# Patient Record
Sex: FEMALE | Race: WHITE | Employment: FULL TIME | URBAN - METROPOLITAN AREA
[De-identification: names, ages, dates, MRNs, and addresses within clinical notes are randomized per-mention and may not be internally consistent; named-entity substitution may affect disease eponyms.]

---

## 2020-02-03 ENCOUNTER — LAB REQUISITION (OUTPATIENT)
Dept: LAB | Facility: HOSPITAL | Age: 45
End: 2020-02-03
Payer: COMMERCIAL

## 2020-02-03 DIAGNOSIS — D22.0 MELANOCYTIC NEVI OF LIP: ICD-10-CM

## 2020-02-03 DIAGNOSIS — D22.39 MELANOCYTIC NEVI OF OTHER PARTS OF FACE: ICD-10-CM

## 2020-02-03 PROCEDURE — 88305 TISSUE EXAM BY PATHOLOGIST: CPT | Performed by: DERMATOLOGY

## 2020-03-06 ENCOUNTER — OFFICE VISIT (OUTPATIENT)
Dept: FAMILY MEDICINE CLINIC | Facility: CLINIC | Age: 45
End: 2020-03-06
Payer: COMMERCIAL

## 2020-03-06 VITALS
DIASTOLIC BLOOD PRESSURE: 78 MMHG | SYSTOLIC BLOOD PRESSURE: 118 MMHG | HEART RATE: 71 BPM | OXYGEN SATURATION: 98 % | TEMPERATURE: 98 F

## 2020-03-06 DIAGNOSIS — L01.00 IMPETIGO: Primary | ICD-10-CM

## 2020-03-06 PROCEDURE — 99202 OFFICE O/P NEW SF 15 MIN: CPT | Performed by: FAMILY MEDICINE

## 2020-03-06 RX ORDER — SULFAMETHOXAZOLE AND TRIMETHOPRIM 800; 160 MG/1; MG/1
1 TABLET ORAL 2 TIMES DAILY
Qty: 14 TABLET | Refills: 0 | Status: SHIPPED | OUTPATIENT
Start: 2020-03-06 | End: 2020-03-13

## 2020-03-06 NOTE — PATIENT INSTRUCTIONS
Take antibiotics with food and plenty of water. Eat yogurt or take probiotic daily. (Staci Bradford is a good example of an OTC probiotic)  Make sure to finish the entire antibiotic treatment.   Increase fluids and rest.     Apply mupirocin to affected area-- twic

## 2020-03-07 NOTE — PROGRESS NOTES
CHIEF COMPLAINT:   Patient presents with:  Rash Skin Problem: small bump inside nostril-- right. noticed 1 week ago. pulled at it and rmoved some scabbing-- skin felt sensitive. but has been spreading towards opening of nostril.  notes watery d/c. had cold palpitations. LUNGS: denies shortness of breath with exertion or rest. No wheezing, no cough. LYMPH: no enlargement of the lymph nodes. MUSC/SKEL: no joint swelling, no joint stiffness. NEURO: no abnormal sensation, no tingling of the skin or numbness. these issues and agrees to the plan. The patient is asked to return in 2-3 days if sx's persist or worsen.

## 2021-09-18 ENCOUNTER — HOSPITAL ENCOUNTER (EMERGENCY)
Facility: HOSPITAL | Age: 46
Discharge: HOME | End: 2021-09-18
Attending: EMERGENCY MEDICINE
Payer: COMMERCIAL

## 2021-09-18 VITALS
BODY MASS INDEX: 20.31 KG/M2 | DIASTOLIC BLOOD PRESSURE: 81 MMHG | OXYGEN SATURATION: 100 % | HEART RATE: 83 BPM | RESPIRATION RATE: 18 BRPM | SYSTOLIC BLOOD PRESSURE: 130 MMHG | TEMPERATURE: 99 F | WEIGHT: 134 LBS | HEIGHT: 68 IN

## 2021-09-18 DIAGNOSIS — S01.81XA FOREHEAD LACERATION, INITIAL ENCOUNTER: Primary | ICD-10-CM

## 2021-09-18 PROCEDURE — 90471 IMMUNIZATION ADMIN: CPT

## 2021-09-18 PROCEDURE — 12011 RPR F/E/E/N/L/M 2.5 CM/<: CPT

## 2021-09-18 PROCEDURE — 99283 EMERGENCY DEPT VISIT LOW MDM: CPT

## 2021-09-18 NOTE — ED PROVIDER NOTES
Patient Seen in: [facility] Emergency Department      History   Patient presents with:  Laceration/Abrasion    Stated Complaint: Hit head with tennis raquet, laceration    Subjective:   HPI    Patient is a pleasant 58-year-old female, presenting for ear normal.   Eyes:      Extraocular Movements: Extraocular movements intact. Conjunctiva/sclera: Conjunctivae normal.   Cardiovascular:      Rate and Rhythm: Normal rate.    Pulmonary:      Effort: Pulmonary effort is normal.   Abdominal:      General Medication List

## 2021-09-20 PROCEDURE — 88377 M/PHMTRC ALYS ISHQUANT/SEMIQ: CPT | Performed by: RADIOLOGY

## 2021-09-21 ENCOUNTER — NURSE NAVIGATOR ENCOUNTER (OUTPATIENT)
Dept: HEMATOLOGY/ONCOLOGY | Facility: HOSPITAL | Age: 46
End: 2021-09-21

## 2021-09-21 NOTE — PROGRESS NOTES
Spoke with patient regarding newly diagnosed breast cancer. Pt had imaging through [facility], and is set up to see Dr. Karis Poe tomorrow. Introduced myself and my role as the breast nurse navigator and how I can assist in her care.  We discussed multidisciplinary

## 2021-09-27 NOTE — PROGRESS NOTES
THE [facility]ematology Oncology Group Consultation Note      Patient Name: Edward Nunez   YOB: 1975  Medical Record Number: [id number]  Consulting Physician: Gal Adame. Juan Manuel Lyman M.D. Referring Physician: Ward Miller M.D.     Date of Con 76s and has had multiple skin cancers including melanoma. Ms. Sintia Lindsey mother was an only child. Ms. Sintia Lindsey maternal grandmother is living in her 80s.  Ms. Sintia Lindsey maternal grandfather  in his early 76s and did not have cancer.       KADEN Position: Sitting, Cuff Size: adult)   Pulse 65   Temp 98.4 °F (36.9 °C) (Temporal)   Resp 16   Ht 1.727 m (5' 7.99\")   Wt 59.9 kg (132 lb)   LMP 08/30/2021   SpO2 100%   BMI 20.08 kg/m²     Physical Examination   Constitutional  Well developed and well n nRBC Absolute 0.000 0.000 - 0.012 10ˆ3/µL    Neutrophils % 59.0 %    Lymphocytes % 31.4 %    Monocytes % 7.4 %    Eosinophils % 1.2 %    Basophils % 1.0 %    nRBC/100 WBC 0.00 %     Radiology   DATE OF SERVICE: 09.17.2021  BILATERAL DIAGNOSTIC MAMMOGRAM WI       ---------------------------------------------------------------------------------------------------  IMPRESSION AND RECOMMENDATION  Suspicious finding.  Lobulated hypoechoic solid mass corresponding to the patient's palpable concern  and nodularity recommend neoadjuvant chemotherapy with TCHP. If she is Her2 not amplified, I will recommend initial surgical resection. I reviewed the surgical options of lumpectomy and mastectomy. Per Dr. Gurwinder Mena, she is a candidate for lumpectomy.  We discusse

## 2021-09-28 ENCOUNTER — OFFICE VISIT (OUTPATIENT)
Dept: SURGERY | Facility: CLINIC | Age: 46
End: 2021-09-28
Payer: COMMERCIAL

## 2021-09-28 ENCOUNTER — NURSE ONLY (OUTPATIENT)
Dept: HEMATOLOGY/ONCOLOGY | Facility: HOSPITAL | Age: 46
End: 2021-09-28
Attending: SPECIALIST
Payer: COMMERCIAL

## 2021-09-28 ENCOUNTER — NURSE NAVIGATOR ENCOUNTER (OUTPATIENT)
Dept: HEMATOLOGY/ONCOLOGY | Facility: HOSPITAL | Age: 46
End: 2021-09-28

## 2021-09-28 ENCOUNTER — GENETICS ENCOUNTER (OUTPATIENT)
Dept: GENETICS | Facility: HOSPITAL | Age: 46
End: 2021-09-28
Attending: SPECIALIST
Payer: COMMERCIAL

## 2021-09-28 VITALS
SYSTOLIC BLOOD PRESSURE: 132 MMHG | BODY MASS INDEX: 20 KG/M2 | TEMPERATURE: 98 F | HEART RATE: 65 BPM | DIASTOLIC BLOOD PRESSURE: 60 MMHG | WEIGHT: 132 LBS | OXYGEN SATURATION: 100 % | RESPIRATION RATE: 16 BRPM | HEIGHT: 67.99 IN

## 2021-09-28 DIAGNOSIS — Z17.0 MALIGNANT NEOPLASM OF UPPER-OUTER QUADRANT OF RIGHT BREAST IN FEMALE, ESTROGEN RECEPTOR POSITIVE (HCC): ICD-10-CM

## 2021-09-28 DIAGNOSIS — C50.411 MALIGNANT NEOPLASM OF UPPER-OUTER QUADRANT OF RIGHT BREAST IN FEMALE, ESTROGEN RECEPTOR POSITIVE (HCC): Primary | ICD-10-CM

## 2021-09-28 DIAGNOSIS — C50.411 MALIGNANT NEOPLASM OF UPPER-OUTER QUADRANT OF RIGHT BREAST IN FEMALE, ESTROGEN RECEPTOR POSITIVE (HCC): ICD-10-CM

## 2021-09-28 DIAGNOSIS — Z17.0 MALIGNANT NEOPLASM OF UPPER-OUTER QUADRANT OF RIGHT BREAST IN FEMALE, ESTROGEN RECEPTOR POSITIVE (HCC): Primary | ICD-10-CM

## 2021-09-28 PROCEDURE — 3075F SYST BP GE 130 - 139MM HG: CPT | Performed by: SPECIALIST

## 2021-09-28 PROCEDURE — 96040 HC GENETIC COUNSELING EA 30 MIN: CPT | Performed by: GENETIC COUNSELOR, MS

## 2021-09-28 PROCEDURE — 36415 COLL VENOUS BLD VENIPUNCTURE: CPT

## 2021-09-28 PROCEDURE — 99244 OFF/OP CNSLTJ NEW/EST MOD 40: CPT | Performed by: SURGERY

## 2021-09-28 PROCEDURE — 99245 OFF/OP CONSLTJ NEW/EST HI 55: CPT | Performed by: SPECIALIST

## 2021-09-28 PROCEDURE — 3078F DIAST BP <80 MM HG: CPT | Performed by: SPECIALIST

## 2021-09-28 PROCEDURE — 3008F BODY MASS INDEX DOCD: CPT | Performed by: SPECIALIST

## 2021-09-28 NOTE — PROGRESS NOTES
Met with patient in clinic. Introduced myself as the breast navigator nurse and explained the role of the breast nurse navigator and coordination of care.  Patient was previously given the breast cancer treatment handbook and reviewed over how to use this r

## 2021-09-28 NOTE — PROGRESS NOTES
Referring Provider:  Michael Jeffery MD    Additional Provider(s):  MD Suman Suggs MD    Reason for Referral:  Jacquelin Beckett was referred for genetic counseling because of a new diagnosis of breast cancer.   Ms. Ange Ansari is 20-24% of women with ovarian cancer have a hereditary cancer syndrome.   Signs of a hereditary cancer syndrome include some rare cancers, common cancers occurring at unusually young ages, multiple primary cancers in the same individual, or the same type of be determined according to personal and family histories and should be discussed with a physician.       A variant of uncertain significance is a DNA change that may or may not alter the function of the gene; therefore, it is usually not possible to determi lifetime risk of male breast cancer. Medical management options for BRCA1/2 mutation carriers include increased breast cancer screening using mammograms and MRI, ovarian cancer screening, chemoprevention, and prophylactic surgery.   Men with a BRCA1/2 mu

## 2021-09-28 NOTE — H&P
New Patient Visit Note       Active Problems      1.  Malignant neoplasm of upper-outer quadrant of right breast in female, estrogen receptor positive [facility])        Chief Complaint   Right breast cancer    History of Present Illness   The patient is a 46-yea Medical History:   Diagnosis Date   • Anemia    • Costochondritis    • Migraine headache    • Sjogren's disease ([facility] [address]      Past Surgical History:   Procedure Laterality Date   • TONSILLECTOMY  1990    DR. PHYLICIA COLÓN IN [facility]       The family easy bleeding and easy bruising  Integumentary:  Negative for pruritus and rash  Musculoskeletal:  Negative for bone/joint symptoms  Neurological:  Negative for gait disturbance  Psychiatric:  Negative for inappropriate interaction and psychiatric symptoms pectoral adenopathy. Left upper body: No axillary or pectoral adenopathy. Neurological:      Mental Status: She is alert and oriented to person, place, and time.    Psychiatric:         Speech: Speech normal.         Behavior: Behavior normal. malignancy     These findings and recommendations were discussed with the patient at the time of examination. Patient stressed verbal understanding. Ordering physician office notified.  Patient was assisted in  scheduling for ultrasound-guided core biopsy Breast MRI   I personally reviewed the report. DATE OF SERVICE: 09.24.2021  EXAMINATION:   1.  MRI of the breasts with and without contrast.  2. Post processing including computer algorithm analysis of MRI image data for lesion  detection/character foci demonstrating subthreshold enhancement kinetics  without  any suspicious dominant enhancing mass or nonmass enhancement. No suspicious internal mammary lymphadenopathy.   Prominent left axillary lymph nodes without a definite fatty hilum (axial image discussed including bleeding, infection, need for reoperation, and arm swelling. She expressed understanding.   · We discussed the role of radiation therapy and if she opts for lumpectomy, she will need radiation therapy and the length and course of radiati care.  Over half of that time was spent on counseling and coordination of care.     Yan Crawford MD

## 2021-09-28 NOTE — PROGRESS NOTES
Patient is here today for Consult with Mirian Sacks for Breast Cancer. Patient denies pain. Medication list and medical history were reviewed and updated.      Education Record    Learner:  Patient and spouse     Disease / Diagnosis: Consult    Valeria / Aleksandra Solano

## 2021-09-28 NOTE — H&P (VIEW-ONLY)
New Patient Visit Note       Active Problems      1.  Malignant neoplasm of upper-outer quadrant of right breast in female, estrogen receptor positive [facility])        Chief Complaint   Right breast cancer    History of Present Illness   The patient is a 46-yea Medical History:   Diagnosis Date   • Anemia    • Costochondritis    • Migraine headache    • Sjogren's disease ([facility] [address]      Past Surgical History:   Procedure Laterality Date   • TONSILLECTOMY  1990    DR. PHYLICIA COLÓN IN [facility]       The family easy bleeding and easy bruising  Integumentary:  Negative for pruritus and rash  Musculoskeletal:  Negative for bone/joint symptoms  Neurological:  Negative for gait disturbance  Psychiatric:  Negative for inappropriate interaction and psychiatric symptoms pectoral adenopathy. Left upper body: No axillary or pectoral adenopathy. Neurological:      Mental Status: She is alert and oriented to person, place, and time.    Psychiatric:         Speech: Speech normal.         Behavior: Behavior normal. malignancy     These findings and recommendations were discussed with the patient at the time of examination. Patient stressed verbal understanding. Ordering physician office notified.  Patient was assisted in  scheduling for ultrasound-guided core biopsy Breast MRI   I personally reviewed the report. DATE OF SERVICE: 09.24.2021  EXAMINATION:   1.  MRI of the breasts with and without contrast.  2. Post processing including computer algorithm analysis of MRI image data for lesion  detection/character foci demonstrating subthreshold enhancement kinetics  without  any suspicious dominant enhancing mass or nonmass enhancement. No suspicious internal mammary lymphadenopathy.   Prominent left axillary lymph nodes without a definite fatty hilum (axial image discussed including bleeding, infection, need for reoperation, and arm swelling. She expressed understanding.   · We discussed the role of radiation therapy and if she opts for lumpectomy, she will need radiation therapy and the length and course of radiati care.  Over half of that time was spent on counseling and coordination of care.     Olamide Nobles MD

## 2021-09-30 ENCOUNTER — NURSE NAVIGATOR ENCOUNTER (OUTPATIENT)
Dept: HEMATOLOGY/ONCOLOGY | Facility: HOSPITAL | Age: 46
End: 2021-09-30

## 2021-09-30 NOTE — PROGRESS NOTES
Called patient and confirmed full name and  and gave her the results of HER-2/FISH of negative.  Stated to her that the plan, per instructions from Dr. Erik Smallwood, was to await the results of her breast ultrasound and genetics and then proceed with breast s

## 2021-10-05 ENCOUNTER — GENETICS ENCOUNTER (OUTPATIENT)
Dept: HEMATOLOGY/ONCOLOGY | Facility: HOSPITAL | Age: 46
End: 2021-10-05

## 2021-10-05 NOTE — PROGRESS NOTES
Referring Provider:  Lilia Leyva MD     Additional Provider(s):  MD Arjun Johnson MD Arelia Creighton, MD     Reason for Referral:  Saira Block had genetic testing performed on 9/28/21 because of a new diagnosis of sakina included in this panel might be the cause of cancer in Ms. Angella Collazo or her relatives. Ms. Angella Collazo should contact me on an annual basis to learn if there have been any updates in genetic testing that would apply to her.     In the meantime, Ms. Sofy Manley

## 2021-10-06 ENCOUNTER — NURSE NAVIGATOR ENCOUNTER (OUTPATIENT)
Dept: HEMATOLOGY/ONCOLOGY | Facility: HOSPITAL | Age: 46
End: 2021-10-06

## 2021-10-06 ENCOUNTER — TELEPHONE (OUTPATIENT)
Dept: SURGERY | Facility: CLINIC | Age: 46
End: 2021-10-06

## 2021-10-06 NOTE — TELEPHONE ENCOUNTER
I called the patient with an update since we now have the final results from her pathology and genetic testing. Her tumor is HER-2 negative, and she has normal genetics.   The patient previously had indicated she would like to go forward with a lumpectomy

## 2021-10-06 NOTE — PROGRESS NOTES
Called patient back and we discussed that her genetic testing was negative and that her ultrasound results were reviewed with her by Dr. Valdemar Snyder and the plan was to proceed with surgery.  I reached out to Dr. Maia Barrios surgery scheduler, Kelechi Batres, to help wit

## 2021-10-07 ENCOUNTER — TELEPHONE (OUTPATIENT)
Dept: GENERAL RADIOLOGY | Facility: HOSPITAL | Age: 46
End: 2021-10-07

## 2021-10-07 ENCOUNTER — TELEPHONE (OUTPATIENT)
Dept: SURGERY | Facility: CLINIC | Age: 46
End: 2021-10-07

## 2021-10-07 DIAGNOSIS — C50.911 MALIGNANT NEOPLASM OF RIGHT FEMALE BREAST, UNSPECIFIED ESTROGEN RECEPTOR STATUS, UNSPECIFIED SITE OF BREAST (HCC): Primary | ICD-10-CM

## 2021-10-08 ENCOUNTER — TELEPHONE (OUTPATIENT)
Dept: GENERAL RADIOLOGY | Facility: HOSPITAL | Age: 46
End: 2021-10-08

## 2021-10-12 ENCOUNTER — LAB ENCOUNTER (OUTPATIENT)
Dept: LAB | Facility: HOSPITAL | Age: 46
End: 2021-10-12
Attending: SURGERY
Payer: COMMERCIAL

## 2021-10-12 DIAGNOSIS — C50.411 MALIGNANT NEOPLASM OF UPPER-OUTER QUADRANT OF RIGHT BREAST IN FEMALE, ESTROGEN RECEPTOR POSITIVE (HCC): ICD-10-CM

## 2021-10-12 DIAGNOSIS — Z17.0 MALIGNANT NEOPLASM OF UPPER-OUTER QUADRANT OF RIGHT BREAST IN FEMALE, ESTROGEN RECEPTOR POSITIVE (HCC): ICD-10-CM

## 2021-10-14 ENCOUNTER — HOSPITAL ENCOUNTER (OUTPATIENT)
Dept: MAMMOGRAPHY | Facility: HOSPITAL | Age: 46
Discharge: HOME | End: 2021-10-14
Attending: SURGERY
Payer: COMMERCIAL

## 2021-10-14 ENCOUNTER — HOSPITAL ENCOUNTER (OUTPATIENT)
Dept: NUCLEAR MEDICINE | Facility: HOSPITAL | Age: 46
Setting detail: HOSPITAL OUTPATIENT SURGERY
Discharge: HOME | End: 2021-10-14
Attending: SURGERY | Admitting: SURGERY
Payer: COMMERCIAL

## 2021-10-14 ENCOUNTER — ANESTHESIA (OUTPATIENT)
Dept: SURGERY | Facility: HOSPITAL | Age: 46
End: 2021-10-14
Payer: COMMERCIAL

## 2021-10-14 ENCOUNTER — APPOINTMENT (OUTPATIENT)
Dept: MAMMOGRAPHY | Facility: HOSPITAL | Age: 46
End: 2021-10-14
Attending: SURGERY
Payer: COMMERCIAL

## 2021-10-14 ENCOUNTER — ANESTHESIA EVENT (OUTPATIENT)
Dept: SURGERY | Facility: HOSPITAL | Age: 46
End: 2021-10-14
Payer: COMMERCIAL

## 2021-10-14 ENCOUNTER — HOSPITAL ENCOUNTER (OUTPATIENT)
Facility: HOSPITAL | Age: 46
Setting detail: HOSPITAL OUTPATIENT SURGERY
Discharge: HOME | End: 2021-10-14
Attending: SURGERY | Admitting: SURGERY
Payer: COMMERCIAL

## 2021-10-14 VITALS
RESPIRATION RATE: 16 BRPM | OXYGEN SATURATION: 99 % | WEIGHT: 134.25 LBS | SYSTOLIC BLOOD PRESSURE: 121 MMHG | HEART RATE: 57 BPM | TEMPERATURE: 99 F | DIASTOLIC BLOOD PRESSURE: 74 MMHG | BODY MASS INDEX: 20.34 KG/M2 | HEIGHT: 68 IN

## 2021-10-14 DIAGNOSIS — C50.911 MALIGNANT NEOPLASM OF RIGHT FEMALE BREAST, UNSPECIFIED ESTROGEN RECEPTOR STATUS, UNSPECIFIED SITE OF BREAST (HCC): ICD-10-CM

## 2021-10-14 DIAGNOSIS — Z17.0 MALIGNANT NEOPLASM OF UPPER-OUTER QUADRANT OF RIGHT BREAST IN FEMALE, ESTROGEN RECEPTOR POSITIVE (HCC): Primary | ICD-10-CM

## 2021-10-14 DIAGNOSIS — C50.411 MALIGNANT NEOPLASM OF UPPER-OUTER QUADRANT OF RIGHT BREAST IN FEMALE, ESTROGEN RECEPTOR POSITIVE (HCC): Primary | ICD-10-CM

## 2021-10-14 PROCEDURE — 81025 URINE PREGNANCY TEST: CPT

## 2021-10-14 PROCEDURE — 3E0W3KZ INTRODUCTION OF OTHER DIAGNOSTIC SUBSTANCE INTO LYMPHATICS, PERCUTANEOUS APPROACH: ICD-10-PCS | Performed by: SURGERY

## 2021-10-14 PROCEDURE — 77065 DX MAMMO INCL CAD UNI: CPT | Performed by: SURGERY

## 2021-10-14 PROCEDURE — 78195 LYMPH SYSTEM IMAGING: CPT | Performed by: SURGERY

## 2021-10-14 PROCEDURE — 07B50ZX EXCISION OF RIGHT AXILLARY LYMPHATIC, OPEN APPROACH, DIAGNOSTIC: ICD-10-PCS | Performed by: SURGERY

## 2021-10-14 PROCEDURE — 19285 PERQ DEV BREAST 1ST US IMAG: CPT | Performed by: SURGERY

## 2021-10-14 PROCEDURE — 88307 TISSUE EXAM BY PATHOLOGIST: CPT | Performed by: SURGERY

## 2021-10-14 PROCEDURE — 0HBT0ZZ EXCISION OF RIGHT BREAST, OPEN APPROACH: ICD-10-PCS | Performed by: SURGERY

## 2021-10-14 PROCEDURE — 88305 TISSUE EXAM BY PATHOLOGIST: CPT | Performed by: SURGERY

## 2021-10-14 PROCEDURE — 76098 X-RAY EXAM SURGICAL SPECIMEN: CPT | Performed by: SURGERY

## 2021-10-14 PROCEDURE — 88360 TUMOR IMMUNOHISTOCHEM/MANUAL: CPT | Performed by: SURGERY

## 2021-10-14 RX ORDER — LIDOCAINE HYDROCHLORIDE 10 MG/ML
INJECTION, SOLUTION EPIDURAL; INFILTRATION; INTRACAUDAL; PERINEURAL AS NEEDED
Status: DISCONTINUED | OUTPATIENT
Start: 2021-10-14 | End: 2021-10-14 | Stop reason: SURG

## 2021-10-14 RX ORDER — HYDROMORPHONE HYDROCHLORIDE 1 MG/ML
0.4 INJECTION, SOLUTION INTRAMUSCULAR; INTRAVENOUS; SUBCUTANEOUS EVERY 5 MIN PRN
Status: DISCONTINUED | OUTPATIENT
Start: 2021-10-14 | End: 2021-10-14

## 2021-10-14 RX ORDER — NALOXONE HYDROCHLORIDE 0.4 MG/ML
80 INJECTION, SOLUTION INTRAMUSCULAR; INTRAVENOUS; SUBCUTANEOUS AS NEEDED
Status: DISCONTINUED | OUTPATIENT
Start: 2021-10-14 | End: 2021-10-14

## 2021-10-14 RX ORDER — SODIUM CHLORIDE, SODIUM LACTATE, POTASSIUM CHLORIDE, CALCIUM CHLORIDE 600; 310; 30; 20 MG/100ML; MG/100ML; MG/100ML; MG/100ML
INJECTION, SOLUTION INTRAVENOUS CONTINUOUS
Status: DISCONTINUED | OUTPATIENT
Start: 2021-10-14 | End: 2021-10-14

## 2021-10-14 RX ORDER — HYDROCODONE BITARTRATE AND ACETAMINOPHEN 5; 325 MG/1; MG/1
1 TABLET ORAL AS NEEDED
Status: COMPLETED | OUTPATIENT
Start: 2021-10-14 | End: 2021-10-14

## 2021-10-14 RX ORDER — HYDROCODONE BITARTRATE AND ACETAMINOPHEN 5; 325 MG/1; MG/1
1 TABLET ORAL EVERY 6 HOURS PRN
Qty: 10 TABLET | Refills: 0 | Status: SHIPPED | OUTPATIENT
Start: 2021-10-14 | End: 2021-10-19

## 2021-10-14 RX ORDER — BUPIVACAINE HYDROCHLORIDE AND EPINEPHRINE 5; 5 MG/ML; UG/ML
INJECTION, SOLUTION EPIDURAL; INTRACAUDAL; PERINEURAL AS NEEDED
Status: DISCONTINUED | OUTPATIENT
Start: 2021-10-14 | End: 2021-10-14 | Stop reason: HOSPADM

## 2021-10-14 RX ORDER — MIDAZOLAM HYDROCHLORIDE 1 MG/ML
INJECTION INTRAMUSCULAR; INTRAVENOUS AS NEEDED
Status: DISCONTINUED | OUTPATIENT
Start: 2021-10-14 | End: 2021-10-14 | Stop reason: SURG

## 2021-10-14 RX ORDER — HYDROCODONE BITARTRATE AND ACETAMINOPHEN 5; 325 MG/1; MG/1
2 TABLET ORAL AS NEEDED
Status: COMPLETED | OUTPATIENT
Start: 2021-10-14 | End: 2021-10-14

## 2021-10-14 RX ORDER — METOCLOPRAMIDE HYDROCHLORIDE 5 MG/ML
INJECTION INTRAMUSCULAR; INTRAVENOUS AS NEEDED
Status: DISCONTINUED | OUTPATIENT
Start: 2021-10-14 | End: 2021-10-14 | Stop reason: SURG

## 2021-10-14 RX ORDER — ACETAMINOPHEN 500 MG
1000 TABLET ORAL ONCE AS NEEDED
Status: DISCONTINUED | OUTPATIENT
Start: 2021-10-14 | End: 2021-10-14

## 2021-10-14 RX ORDER — DIPHENHYDRAMINE HYDROCHLORIDE 50 MG/ML
12.5 INJECTION INTRAMUSCULAR; INTRAVENOUS AS NEEDED
Status: DISCONTINUED | OUTPATIENT
Start: 2021-10-14 | End: 2021-10-14

## 2021-10-14 RX ORDER — ACETAMINOPHEN 500 MG
1000 TABLET ORAL ONCE
COMMUNITY
End: 2021-10-14

## 2021-10-14 RX ORDER — ONDANSETRON 2 MG/ML
INJECTION INTRAMUSCULAR; INTRAVENOUS AS NEEDED
Status: DISCONTINUED | OUTPATIENT
Start: 2021-10-14 | End: 2021-10-14 | Stop reason: SURG

## 2021-10-14 RX ORDER — DIAZEPAM 5 MG/1
5 TABLET ORAL AS NEEDED
Status: DISCONTINUED | OUTPATIENT
Start: 2021-10-14 | End: 2021-10-14 | Stop reason: HOSPADM

## 2021-10-14 RX ORDER — CLINDAMYCIN PHOSPHATE 900 MG/50ML
900 INJECTION INTRAVENOUS ONCE
Status: COMPLETED | OUTPATIENT
Start: 2021-10-14 | End: 2021-10-14

## 2021-10-14 RX ORDER — LIDOCAINE AND PRILOCAINE 25; 25 MG/G; MG/G
CREAM TOPICAL ONCE
Status: COMPLETED | OUTPATIENT
Start: 2021-10-14 | End: 2021-10-14

## 2021-10-14 RX ORDER — DEXAMETHASONE SODIUM PHOSPHATE 4 MG/ML
VIAL (ML) INJECTION AS NEEDED
Status: DISCONTINUED | OUTPATIENT
Start: 2021-10-14 | End: 2021-10-14 | Stop reason: SURG

## 2021-10-14 RX ORDER — ACETAMINOPHEN 500 MG
1000 TABLET ORAL ONCE
Status: COMPLETED | OUTPATIENT
Start: 2021-10-14 | End: 2021-10-14

## 2021-10-14 RX ORDER — HYDROMORPHONE HYDROCHLORIDE 1 MG/ML
INJECTION, SOLUTION INTRAMUSCULAR; INTRAVENOUS; SUBCUTANEOUS
Status: COMPLETED
Start: 2021-10-14 | End: 2021-10-14

## 2021-10-14 RX ADMIN — CLINDAMYCIN PHOSPHATE 900 MG: 900 INJECTION INTRAVENOUS at 13:45:00

## 2021-10-14 RX ADMIN — SODIUM CHLORIDE, SODIUM LACTATE, POTASSIUM CHLORIDE, CALCIUM CHLORIDE: 600; 310; 30; 20 INJECTION, SOLUTION INTRAVENOUS at 13:37:00

## 2021-10-14 RX ADMIN — METOCLOPRAMIDE HYDROCHLORIDE 10 MG: 5 INJECTION INTRAMUSCULAR; INTRAVENOUS at 13:45:00

## 2021-10-14 RX ADMIN — SODIUM CHLORIDE, SODIUM LACTATE, POTASSIUM CHLORIDE, CALCIUM CHLORIDE: 600; 310; 30; 20 INJECTION, SOLUTION INTRAVENOUS at 14:04:00

## 2021-10-14 RX ADMIN — ONDANSETRON 4 MG: 2 INJECTION INTRAMUSCULAR; INTRAVENOUS at 13:45:00

## 2021-10-14 RX ADMIN — DEXAMETHASONE SODIUM PHOSPHATE 8 MG: 4 MG/ML VIAL (ML) INJECTION at 13:45:00

## 2021-10-14 RX ADMIN — MIDAZOLAM HYDROCHLORIDE 2 MG: 1 INJECTION INTRAMUSCULAR; INTRAVENOUS at 13:37:00

## 2021-10-14 RX ADMIN — LIDOCAINE HYDROCHLORIDE 50 MG: 10 INJECTION, SOLUTION EPIDURAL; INFILTRATION; INTRACAUDAL; PERINEURAL at 13:41:00

## 2021-10-14 NOTE — ANESTHESIA PROCEDURE NOTES
Airway  Date/Time: 10/14/2021 1:42 PM  Urgency: elective    Airway not difficult    General Information and Staff    Patient location during procedure: OR  Anesthesiologist: Fartun Kyle MD  Resident/CRNA: Concha Eduardo CRNA  Performed: CRNA     Yasmin

## 2021-10-14 NOTE — ANESTHESIA POSTPROCEDURE EVALUATION
[address] [facility] Patient Status:  Hospital Outpatient Surgery   Age/Gender 51year old female MRN [id number]   [facility] SURGERY Attending Blue Green MD   Hosp Day # 0 PCP Michelle Fermin MD       Anesthesia Pos

## 2021-10-14 NOTE — INTERVAL H&P NOTE
Pre-op Diagnosis: Malignant neoplasm of right female breast, unspecified estrogen receptor status, unspecified site of breast ([address] [C50.911]    The above referenced H&P was reviewed by Babatunde Bonner MD on 10/14/2021, the patient was examined and no

## 2021-10-14 NOTE — IMAGING NOTE
Assisted  with ultrasound guided needle localization of the right breast.   Farhat Bynum identified with spelling of name and date of birth. Medications and allergies reviewed.  The following allergies were reported:    PenicillinsANAPHYL

## 2021-10-14 NOTE — ANESTHESIA PREPROCEDURE EVALUATION
PRE-OP EVALUATION    Patient Name: Tommy Woody    Admit Diagnosis: Malignant neoplasm of right female breast, unspecified estrogen receptor status, unspecified site of breast [facility] [C50.911]    Pre-op Diagnosis: Malignant neoplasm of right female Pulmonary    Negative pulmonary ROS. Neuro/Psych    Negative neuro/psych ROS.                           Patient Active Problem List:     Xyphoidalgia     Costochondritis     Keratitis sicca, bilateral ([address]     Malignant neoplasm of

## 2021-10-14 NOTE — OPERATIVE REPORT
[facility]  Op Note    Brandi Su Location: OR   CSN [id number] MRN [id number]   Admission Date 10/14/2021 Operation Date 10/14/2021   Attending Physician Michelle Urias MD Operating Physician Kimberly Canavan, MD     DATE OF OPERATION breast lesion. She also had radionucleotide injected into her right breast. She was then brought up to the operating room where general anesthesia was induced.  6 cc of 50% methylene blue was injected into the breast. The breast was then massaged for 5 barber then grasped. Curved Becker scissors and cautery were used to sharply dissect this tissue free. The wire was delivered into the wound, and the tissue ultimately excised. The specimen was marked long stitch lateral, short stitch superior.   The breast tissue

## 2021-10-14 NOTE — PROGRESS NOTES
Pt c/o migraine since prior to arrival to preop, rates #9 out of 10. Lights off in preop rm, ice pack given for head per pt request, IVF bolus of 200cc LR given.

## 2021-10-15 ENCOUNTER — NURSE NAVIGATOR ENCOUNTER (OUTPATIENT)
Dept: HEMATOLOGY/ONCOLOGY | Facility: HOSPITAL | Age: 46
End: 2021-10-15

## 2021-10-15 NOTE — PROGRESS NOTES
Called patient to check to see how she was feeling after her breast surgery by Dr. Marlon Rivera and to schedule her post-op appointment with Dr. Marlon Rivera and Dr. Leonel Palacioite.  Patient stated she was doing well and offers no complaints at this time related to her surger

## 2021-10-19 ENCOUNTER — OFFICE VISIT (OUTPATIENT)
Dept: HEMATOLOGY/ONCOLOGY | Facility: HOSPITAL | Age: 46
End: 2021-10-19
Attending: SPECIALIST
Payer: COMMERCIAL

## 2021-10-19 ENCOUNTER — NURSE NAVIGATOR ENCOUNTER (OUTPATIENT)
Dept: HEMATOLOGY/ONCOLOGY | Facility: HOSPITAL | Age: 46
End: 2021-10-19

## 2021-10-19 ENCOUNTER — OFFICE VISIT (OUTPATIENT)
Dept: SURGERY | Facility: CLINIC | Age: 46
End: 2021-10-19

## 2021-10-19 VITALS
BODY MASS INDEX: 19.85 KG/M2 | TEMPERATURE: 98 F | OXYGEN SATURATION: 100 % | SYSTOLIC BLOOD PRESSURE: 119 MMHG | WEIGHT: 131 LBS | RESPIRATION RATE: 16 BRPM | HEIGHT: 67.99 IN | DIASTOLIC BLOOD PRESSURE: 80 MMHG | HEART RATE: 72 BPM

## 2021-10-19 DIAGNOSIS — C50.411 MALIGNANT NEOPLASM OF UPPER-OUTER QUADRANT OF RIGHT BREAST IN FEMALE, ESTROGEN RECEPTOR POSITIVE (HCC): Primary | ICD-10-CM

## 2021-10-19 DIAGNOSIS — Z17.0 MALIGNANT NEOPLASM OF UPPER-OUTER QUADRANT OF RIGHT BREAST IN FEMALE, ESTROGEN RECEPTOR POSITIVE (HCC): Primary | ICD-10-CM

## 2021-10-19 PROCEDURE — 99024 POSTOP FOLLOW-UP VISIT: CPT | Performed by: SURGERY

## 2021-10-19 PROCEDURE — 99214 OFFICE O/P EST MOD 30 MIN: CPT | Performed by: SPECIALIST

## 2021-10-19 PROCEDURE — 99211 OFF/OP EST MAY X REQ PHY/QHP: CPT

## 2021-10-19 RX ORDER — CHLORAL HYDRATE 500 MG
1000 CAPSULE ORAL DAILY
COMMUNITY

## 2021-10-19 NOTE — PROGRESS NOTES
Patient presents to clinic for breast follow up. RT breast needle localization lumpectomy performed on 10-14-21. Here to discuss findings. Medication and allergies reviewed and updated.

## 2021-10-19 NOTE — PROGRESS NOTES
Met with patient following appointment with Dr. Kenia David and Dr. Escobar Lopez. Pt is done with surgery, she is doing well. Dr. Kenia David recommends oncotype testing, which we was sent today.  She was given information on goserelin injections, as she is pre menopausa

## 2021-10-19 NOTE — PROGRESS NOTES
THE [facility] Hematology Oncology Group Progress Note      Patient Name: Tia Foster   YOB: 1975  Medical Record Number: [id number]  Attending Physician: Aditya Robin. Hossein Millard M.D.      Date of Visit: 10/19/2021       Chief Complaint  Invasive invasive ductal carcinoma with the larger measuring 1.4 cm; associated grade 2 and 3 ductal carcinoma-in-situ; 6 lymph nodes negative for metastasis (0/6); and negative surgical margins. Her2 by immunohistochemistry was repeated and was 1+ (negative). DAILY, Disp: , Rfl:     Allergies   Ms. Cameron Infante is allergic to penicillins, radiology contrast iodinated dyes, and penicillin g potassium. Vital Signs   Reviewed in electronic medical record.     Physical Examination   Constitutional  Well developed breast, superior margin, excision:  -Benign breast tissue.      I. Right breast, lateral margin, excision:  -Benign breast tissue.      J. Right breast, anterior margin, excision:  -Benign breast tissue.      Discussed pathologic results with Dr. Davonte Segura

## 2021-10-20 ENCOUNTER — TELEPHONE (OUTPATIENT)
Dept: SURGERY | Facility: CLINIC | Age: 46
End: 2021-10-20

## 2021-10-20 NOTE — PROGRESS NOTES
Subjective:   Patient ID: Tommy Woody is a 51year old female who underwent x-ray localized right breast lumpectomy with sentinel lymph node biopsy on October 14, 2021. The patient presents for follow-up. She has minimal incisional complaints. ductal carcinoma, largest measuring 14 mm (1.4 cm). (See comment)       -Grade 2 (Tubules: 3, Nuclei: 2, Mitoses: 1). -Ductal carcinoma in situ (DCIS)       -Nuclear grades 2 and 3, crirbiform type.        -Associated focal necrosis and microcalcifications be due for a mammogram in 6 months. The order was placed at today's visit. I will see her for a surveillance after her next imaging. · She should return to my attention sooner with any changes to her breasts.     Amanda Mishra MD

## 2021-10-21 NOTE — PROGRESS NOTES
Called patient   States headaches are much better   States has very bad migraines on period   States usually takes Excedrin at start of Migraine but was unable to do so on the day of surgery     FYI to PCP

## 2021-10-22 PROBLEM — E28.39 SUPPRESSION OF OVARIAN SECRETION: Status: ACTIVE | Noted: 2021-10-22

## 2021-11-08 ENCOUNTER — NURSE NAVIGATOR ENCOUNTER (OUTPATIENT)
Dept: HEMATOLOGY/ONCOLOGY | Facility: HOSPITAL | Age: 46
End: 2021-11-08

## 2021-11-08 NOTE — PROGRESS NOTES
Nursing Consultation Note  Patient: Norma Olguin  YOB: 1975  Age: 51year old  Radiation Oncologist: Dr. Calin Barney  Referring Physician: Dr. Arturo Gonsalves  Diagnosis: RIGHT BREAST CA  Consult Date: 11/9/2021 Negative. Hematological: Negative. Psychiatric/Behavioral: Negative.            Allergies:    Penicillins             ANAPHYLAXIS  Radiology Contrast *    ANAPHYLAXIS  Penicillin G Potass*        Current Outpatient Medications   Medication Sig Dispens History Narrative      , lives with  and 15 y/o son    Social Determinants of Health  Financial Resource Strain:       Difficulty of Paying Living Expenses: Not on file  Food Insecurity:       Worried About [address] [place] the [place] decisions. Advanced directives:  Patient DOES NOT have advanced directives.   Transportation:  Adequate transportation available for expected visits    Pain:   ;Pain Score: 0   ;    ;

## 2021-11-08 NOTE — PROGRESS NOTES
Called patient to notify her of her Oncotype score of 25, as instructed by Dr. Flaco Tejada.  Instructed her that Dr. Flaco Tejada can meet with her tomorrow, Tuesday November 9, 2021 at 1300 at the [facility] to discuss further her Oncotype score and th

## 2021-11-08 NOTE — PROGRESS NOTES
LMOVMTCB regarding scheduling conflicts to switch appointment with Dr. Hipolito Funk for tomorrow, Tuesday November 9, 2021 at 1200 instead of 1300. Awaiting phone call back from patient.

## 2021-11-09 ENCOUNTER — HOSPITAL ENCOUNTER (OUTPATIENT)
Dept: RADIATION ONCOLOGY | Facility: HOSPITAL | Age: 46
Discharge: HOME | End: 2021-11-09
Attending: RADIOLOGY
Payer: COMMERCIAL

## 2021-11-09 ENCOUNTER — OFFICE VISIT (OUTPATIENT)
Dept: HEMATOLOGY/ONCOLOGY | Facility: HOSPITAL | Age: 46
End: 2021-11-09
Attending: SPECIALIST
Payer: COMMERCIAL

## 2021-11-09 VITALS
SYSTOLIC BLOOD PRESSURE: 126 MMHG | HEIGHT: 69 IN | TEMPERATURE: 98 F | OXYGEN SATURATION: 100 % | WEIGHT: 131.63 LBS | BODY MASS INDEX: 19.5 KG/M2 | HEART RATE: 68 BPM | RESPIRATION RATE: 16 BRPM | DIASTOLIC BLOOD PRESSURE: 80 MMHG

## 2021-11-09 VITALS
HEIGHT: 69.02 IN | DIASTOLIC BLOOD PRESSURE: 75 MMHG | RESPIRATION RATE: 16 BRPM | SYSTOLIC BLOOD PRESSURE: 138 MMHG | OXYGEN SATURATION: 100 % | BODY MASS INDEX: 19.73 KG/M2 | WEIGHT: 133.19 LBS | TEMPERATURE: 98 F | HEART RATE: 60 BPM

## 2021-11-09 DIAGNOSIS — Z17.0 MALIGNANT NEOPLASM OF UPPER-OUTER QUADRANT OF RIGHT BREAST IN FEMALE, ESTROGEN RECEPTOR POSITIVE (HCC): Primary | ICD-10-CM

## 2021-11-09 DIAGNOSIS — C50.411 MALIGNANT NEOPLASM OF UPPER-OUTER QUADRANT OF RIGHT BREAST IN FEMALE, ESTROGEN RECEPTOR POSITIVE (HCC): ICD-10-CM

## 2021-11-09 DIAGNOSIS — Z17.0 MALIGNANT NEOPLASM OF UPPER-OUTER QUADRANT OF RIGHT BREAST IN FEMALE, ESTROGEN RECEPTOR POSITIVE (HCC): ICD-10-CM

## 2021-11-09 DIAGNOSIS — C50.411 MALIGNANT NEOPLASM OF UPPER-OUTER QUADRANT OF RIGHT BREAST IN FEMALE, ESTROGEN RECEPTOR POSITIVE (HCC): Primary | ICD-10-CM

## 2021-11-09 PROCEDURE — 99215 OFFICE O/P EST HI 40 MIN: CPT | Performed by: SPECIALIST

## 2021-11-09 PROCEDURE — 99214 OFFICE O/P EST MOD 30 MIN: CPT

## 2021-11-09 NOTE — PROGRESS NOTES
Patient is here today for follow up with Samantha Scherer for Breast Cancer. Test results. Patient denies pain. Medication list and medical history were reviewed and updated.      Education Record    Learner:  Patient    Disease / Diagnosis: Breast Cancer    Bar

## 2021-11-09 NOTE — CONSULTS
[facility] RADIATION ONCOLOGY CONSULTATION     PATIENT:   Christopher Del Rio MD:  Jonna Rivera MD      DIAGNOSIS:   T1c(m) N0 M0 right upper outer breast cancer        CC:    Rt breast cancer    HPI   50 yo woman felt a lump in the r 3  omega-3 fatty acids 1000 MG Oral Cap, Take 1,000 mg by mouth daily.  (Patient not taking: Reported on 11/9/2021), Disp: , Rfl:   Magnesium 250 MG Oral Tab, 1 TABLET DAILY (Patient not taking: Reported on 11/9/2021), Disp: , Rfl:     Allergies:    Penicil

## 2021-11-09 NOTE — PATIENT INSTRUCTIONS
- WE WILL CALL TO SCHEDULE YOU FOR A CT SIMULATION FOR RADIATION PLANNING.       - IF YOU HAVE ANY QUESTIONS OR CONCERNS ABOUT RADIATION THERAPY, PLEASE CALL [phone].

## 2021-11-10 ENCOUNTER — NURSE NAVIGATOR ENCOUNTER (OUTPATIENT)
Dept: HEMATOLOGY/ONCOLOGY | Facility: HOSPITAL | Age: 46
End: 2021-11-10

## 2021-11-10 NOTE — PROGRESS NOTES
THE [facility] Hematology Oncology Group Progress Note      Patient Name: Melisa Whitfield   YOB: 1975  Medical Record Number: [id number]  Attending Physician: Roseline Yoder. Cameron Sandoval M.D.      Date of Visit: 11/9/2021      Chief Complaint  Invasive ma invasive ductal carcinoma with the larger measuring 1.4 cm; associated grade 2 and 3 ductal carcinoma-in-situ; 6 lymph nodes negative for metastasis (0/6); and negative surgical margins. Her2 by immunohistochemistry was repeated and was 1+ (negative). radiology contrast iodinated dyes, and penicillin g potassium.     Vital Signs   Height: 175.3 cm (5' 9.02\") (11/09 1202)  Weight: 60.4 kg (133 lb 3.2 oz) (11/09 1202)  BSA (Calculated - sq m): 1.74 sq meters (11/09 1202)  Pulse: 60 (11/09 1202)  BP: 138/7 pursued, I recommend docetaxel and cyclophosphamide. I reviewed the curative goals of therapy and the potential adverse effects including, but not limited to, peripheral neuropathy, myelosuppression, and MDS/leukemia.           Adjuvant endocrine therapy is

## 2021-11-10 NOTE — PROGRESS NOTES
LVM for patient regarding chemotherapy. Pt left a message that she would like to proceed with chemotherapy. Pt would like to start on Monday 11/15. Assisted patient in scheduling chemotherapy education, as well as chemotherapy infusion on Monday 11/15.  She

## 2021-11-15 ENCOUNTER — OFFICE VISIT (OUTPATIENT)
Dept: HEMATOLOGY/ONCOLOGY | Facility: HOSPITAL | Age: 46
End: 2021-11-15
Attending: SPECIALIST
Payer: COMMERCIAL

## 2021-11-15 VITALS
TEMPERATURE: 99 F | OXYGEN SATURATION: 100 % | SYSTOLIC BLOOD PRESSURE: 131 MMHG | WEIGHT: 134.81 LBS | HEIGHT: 67.44 IN | HEART RATE: 70 BPM | RESPIRATION RATE: 16 BRPM | DIASTOLIC BLOOD PRESSURE: 79 MMHG | BODY MASS INDEX: 20.91 KG/M2

## 2021-11-15 DIAGNOSIS — Z17.0 MALIGNANT NEOPLASM OF UPPER-OUTER QUADRANT OF RIGHT BREAST IN FEMALE, ESTROGEN RECEPTOR POSITIVE (HCC): Primary | ICD-10-CM

## 2021-11-15 DIAGNOSIS — C50.411 MALIGNANT NEOPLASM OF UPPER-OUTER QUADRANT OF RIGHT BREAST IN FEMALE, ESTROGEN RECEPTOR POSITIVE (HCC): Primary | ICD-10-CM

## 2021-11-15 DIAGNOSIS — T45.1X5A ALOPECIA DUE TO CYTOTOXIC DRUG: ICD-10-CM

## 2021-11-15 DIAGNOSIS — L65.8 ALOPECIA DUE TO CYTOTOXIC DRUG: ICD-10-CM

## 2021-11-15 DIAGNOSIS — Z71.9 ENCOUNTER FOR EDUCATION: ICD-10-CM

## 2021-11-15 PROCEDURE — 96417 CHEMO IV INFUS EACH ADDL SEQ: CPT

## 2021-11-15 PROCEDURE — 96377 APPLICATON ON-BODY INJECTOR: CPT

## 2021-11-15 PROCEDURE — 99215 OFFICE O/P EST HI 40 MIN: CPT | Performed by: NURSE PRACTITIONER

## 2021-11-15 PROCEDURE — 99417 PROLNG OP E/M EACH 15 MIN: CPT | Performed by: NURSE PRACTITIONER

## 2021-11-15 PROCEDURE — 96413 CHEMO IV INFUSION 1 HR: CPT

## 2021-11-15 PROCEDURE — 96375 TX/PRO/DX INJ NEW DRUG ADDON: CPT

## 2021-11-15 RX ORDER — ONDANSETRON HYDROCHLORIDE 8 MG/1
8 TABLET, FILM COATED ORAL EVERY 8 HOURS PRN
Qty: 30 TABLET | Refills: 3 | Status: SHIPPED | OUTPATIENT
Start: 2021-11-15

## 2021-11-15 RX ORDER — PROCHLORPERAZINE MALEATE 10 MG
10 TABLET ORAL EVERY 6 HOURS PRN
Qty: 30 TABLET | Refills: 3 | Status: SHIPPED | OUTPATIENT
Start: 2021-11-15

## 2021-11-15 NOTE — PROGRESS NOTES
Pt here for C1D1 Taxotere/Cytoxan.   Arrives Ambulating independently, accompanied by Self           Pregnancy screening: Denies possibility of pregnancy    Modifications in dose or schedule: No     Frequency of blood return and site check throughout admini

## 2021-11-15 NOTE — PROGRESS NOTES
IV Chemotherapy Education    Learner:  Patient    Barriers / Limitations:  None    Chemotherapy education goals:  · Learn the drug names  · Administration schedule  · Routes of administration  · Treatment setting    Drug names:  Docetaxel + cyclophosphamid Signs / symptoms include redness, swelling, pain, burning, or blistering at the site of administration  Notify MD/RN IMMEDIATELY if you have any of the above signs / symptoms      Teaching Materials Provided:     [facility] Chemotherapy information sheets

## 2021-11-15 NOTE — PATIENT INSTRUCTIONS
On-body Injector for Neulasta Patient Instructions       Your On-Body Injection device was applied on this day: 11/15  at this time 1200    Your dose of medication will start on this day: 11/16 at this time 3PM

## 2021-11-16 ENCOUNTER — TELEPHONE (OUTPATIENT)
Dept: HEMATOLOGY/ONCOLOGY | Facility: HOSPITAL | Age: 46
End: 2021-11-16

## 2021-11-16 NOTE — TELEPHONE ENCOUNTER
Avril reports that she had a little trouble falling asleep last night due to the dexamethasone. Today she feels \"very well. \" I encouraged her to please call the office if she is not feeling well or she has any questions or concerns.  She thanked me for

## 2021-11-16 NOTE — TELEPHONE ENCOUNTER
Toxicities: C1 D1 Docetaxel/Cyclophosphamide/Pegfilgrastim on 11/15/2021    I attempted to reach Avril to see how she is feeling after her first treatment. I left a voice mail message asking her to please return my call at her earliest convenience.

## 2021-11-16 NOTE — TELEPHONE ENCOUNTER
Avril called. She states she is returning a call from Татьяна Mckeon [facility]. Please call.

## 2021-11-17 NOTE — PROGRESS NOTES
Cancer Center Progress Note    Patient Name: Citlaly Mason   YOB: 1975   Medical Record Number: [id number]   [facility]: [id number]   Date of visit: 11/22/2021  Provider: JAMAR Lopez  Referring Physician: Azar Hilario (sequencing only), PALB2, PDGRFA, PMS2, POLD1, POLE, PTEN, RAD50, RAD51C, RAD51D, SDHA (sequencing only), SDHB, SDHC, SDHD, SMAD4, SMARCA4, STK11, TP53, TSC1, TSC2, and VHL.     On 10/14/2021, she underwent right breast lumpectomy with sentinel lymph node daily. (Patient not taking: Reported on 11/9/2021), Disp: , Rfl:   •  cycloSPORINE (RESTASIS) 0.05 % Ophthalmic Emulsion, Place 1 drop into both eyes 2 (two) times daily. , Disp: 180 each, Rfl: 3  •  Magnesium 250 MG Oral Tab, Take by mouth.  (Patient not ta

## 2021-11-22 ENCOUNTER — OFFICE VISIT (OUTPATIENT)
Dept: HEMATOLOGY/ONCOLOGY | Facility: HOSPITAL | Age: 46
End: 2021-11-22
Attending: SPECIALIST
Payer: COMMERCIAL

## 2021-11-22 VITALS
TEMPERATURE: 98 F | BODY MASS INDEX: 20.69 KG/M2 | WEIGHT: 133.38 LBS | HEIGHT: 67.44 IN | OXYGEN SATURATION: 100 % | RESPIRATION RATE: 18 BRPM | HEART RATE: 79 BPM | SYSTOLIC BLOOD PRESSURE: 112 MMHG | DIASTOLIC BLOOD PRESSURE: 74 MMHG

## 2021-11-22 DIAGNOSIS — M89.8X9 BONE PAIN DUE TO G-CSF: ICD-10-CM

## 2021-11-22 DIAGNOSIS — Z17.0 MALIGNANT NEOPLASM OF UPPER-OUTER QUADRANT OF RIGHT BREAST IN FEMALE, ESTROGEN RECEPTOR POSITIVE (HCC): Primary | ICD-10-CM

## 2021-11-22 DIAGNOSIS — C50.411 MALIGNANT NEOPLASM OF UPPER-OUTER QUADRANT OF RIGHT BREAST IN FEMALE, ESTROGEN RECEPTOR POSITIVE (HCC): Primary | ICD-10-CM

## 2021-11-22 PROCEDURE — 99215 OFFICE O/P EST HI 40 MIN: CPT | Performed by: CLINICAL NURSE SPECIALIST

## 2021-11-22 NOTE — PROGRESS NOTES
Patient presents with: Follow - Up: APN assessment Day 8    Pt is here for follow up - Day 8 TC. Pt reports to feeling \"better than expected\".  Was able to eat and drink ok for a few days; then had some pain with eating for 2 days over the weekend - was

## 2021-12-05 NOTE — PROGRESS NOTES
[facility] Hematology Oncology Group Progress Note      Patient Name: Art Mandy   YOB: 1975  Medical Record Number: [id number]  Attending Physician: Alfredo Peres. Nolan Chau M.D.      Date of Visit: 12/6/2021      Chief Complaint  Invasive ma invasive ductal carcinoma with the larger measuring 1.4 cm; associated grade 2 and 3 ductal carcinoma-in-situ; 6 lymph nodes negative for metastasis (0/6); and negative surgical margins. Her2 by immunohistochemistry was repeated and was 1+ (negative). data, reviewed by physician)  Denies tobacco use.        Current Medications  ondansetron (ZOFRAN) 8 MG tablet, Take 1 tablet (8 mg total) by mouth every 8 (eight) hours as needed for Nausea., Disp: 30 tablet, Rfl: 3  prochlorperazine (COMPAZINE) 10 mg tabl Potassium 3.7 3.5 - 5.1 mmol/L    Chloride 112 98 - 112 mmol/L    CO2 25.0 21.0 - 32.0 mmol/L    Anion Gap 4 0 - 18 mmol/L    BUN 14 7 - 18 mg/dL    Creatinine 0.59 0.55 - 1.02 mg/dL    Calcium, Total 8.1 (L) 8.5 - 10.1 mg/dL    Calculated Osmolality 291 2 Adjuvant endocrine therapy is recommended. Planned Follow Up   Patient will return for follow up and chemotherapy in 3 weeks. Electronically signed by:    Negro Pathak M.D.   [facility]

## 2021-12-06 ENCOUNTER — OFFICE VISIT (OUTPATIENT)
Dept: HEMATOLOGY/ONCOLOGY | Facility: HOSPITAL | Age: 46
End: 2021-12-06
Attending: SPECIALIST
Payer: COMMERCIAL

## 2021-12-06 VITALS
OXYGEN SATURATION: 100 % | HEART RATE: 69 BPM | RESPIRATION RATE: 16 BRPM | DIASTOLIC BLOOD PRESSURE: 75 MMHG | BODY MASS INDEX: 21.19 KG/M2 | TEMPERATURE: 97 F | HEIGHT: 67.44 IN | WEIGHT: 136.63 LBS | SYSTOLIC BLOOD PRESSURE: 122 MMHG

## 2021-12-06 DIAGNOSIS — C50.411 MALIGNANT NEOPLASM OF UPPER-OUTER QUADRANT OF RIGHT BREAST IN FEMALE, ESTROGEN RECEPTOR POSITIVE (HCC): Primary | ICD-10-CM

## 2021-12-06 DIAGNOSIS — Z17.0 MALIGNANT NEOPLASM OF UPPER-OUTER QUADRANT OF RIGHT BREAST IN FEMALE, ESTROGEN RECEPTOR POSITIVE (HCC): Primary | ICD-10-CM

## 2021-12-06 DIAGNOSIS — Z79.899 ON ANTINEOPLASTIC CHEMOTHERAPY: ICD-10-CM

## 2021-12-06 PROCEDURE — 96417 CHEMO IV INFUS EACH ADDL SEQ: CPT

## 2021-12-06 PROCEDURE — 99215 OFFICE O/P EST HI 40 MIN: CPT | Performed by: SPECIALIST

## 2021-12-06 PROCEDURE — 96413 CHEMO IV INFUSION 1 HR: CPT

## 2021-12-06 PROCEDURE — 96377 APPLICATON ON-BODY INJECTOR: CPT

## 2021-12-06 PROCEDURE — 96375 TX/PRO/DX INJ NEW DRUG ADDON: CPT

## 2021-12-06 NOTE — PROGRESS NOTES
Patient is here today for follow up with Shama Tran for Breast Cancer C2D1 Taxotere/Cytoxan today. Patient denies pain. Denies nausea with antiemetics. Appetite decreased few days post treatment  - than good.  Neuropathy fingertips day 8-10 - stated burning

## 2021-12-06 NOTE — PATIENT INSTRUCTIONS
On-body Injector for Neulasta Patient Instructions       Your On-Body Injection device was applied on this day: 12/6 at this time 12:30 pm    Your dose of medication will start on this day: 12/7 at this time 3:30

## 2021-12-06 NOTE — PROGRESS NOTES
Pt here for C2 D1 Taxotere/cytoxan.   Arrives Ambulating independently, accompanied by Self           Pregnancy screening: Denies possibility of pregnancy    Modifications in dose or schedule: No     Frequency of blood return and site check throughout admin

## 2021-12-27 ENCOUNTER — OFFICE VISIT (OUTPATIENT)
Dept: HEMATOLOGY/ONCOLOGY | Facility: HOSPITAL | Age: 46
End: 2021-12-27
Attending: SPECIALIST
Payer: COMMERCIAL

## 2021-12-27 VITALS
HEART RATE: 55 BPM | HEIGHT: 67.44 IN | DIASTOLIC BLOOD PRESSURE: 59 MMHG | RESPIRATION RATE: 16 BRPM | SYSTOLIC BLOOD PRESSURE: 125 MMHG | OXYGEN SATURATION: 100 % | WEIGHT: 138.19 LBS | BODY MASS INDEX: 21.44 KG/M2 | TEMPERATURE: 98 F

## 2021-12-27 DIAGNOSIS — C50.411 MALIGNANT NEOPLASM OF UPPER-OUTER QUADRANT OF RIGHT BREAST IN FEMALE, ESTROGEN RECEPTOR POSITIVE (HCC): Primary | ICD-10-CM

## 2021-12-27 DIAGNOSIS — Z17.0 MALIGNANT NEOPLASM OF UPPER-OUTER QUADRANT OF RIGHT BREAST IN FEMALE, ESTROGEN RECEPTOR POSITIVE (HCC): Primary | ICD-10-CM

## 2021-12-27 PROCEDURE — 96417 CHEMO IV INFUS EACH ADDL SEQ: CPT

## 2021-12-27 PROCEDURE — 99215 OFFICE O/P EST HI 40 MIN: CPT | Performed by: NURSE PRACTITIONER

## 2021-12-27 PROCEDURE — 96377 APPLICATON ON-BODY INJECTOR: CPT

## 2021-12-27 PROCEDURE — 96375 TX/PRO/DX INJ NEW DRUG ADDON: CPT

## 2021-12-27 PROCEDURE — 96413 CHEMO IV INFUSION 1 HR: CPT

## 2021-12-27 NOTE — PROGRESS NOTES
Patient presents with: Follow - Up: apn assessment    Patient here for apn assessment C3 Vincristine/Irinotecan is expected.   Patient doing well eating and drinking- denies N/V/D did have some constipation the first week after treatment but resolved on it

## 2021-12-27 NOTE — PROGRESS NOTES
Pt here for C3 D1 taxotere/cytoxan.   Arrives Ambulating independently, accompanied by Self           Pregnancy screening: Denies possibility of pregnancy    Modifications in dose or schedule: No     Frequency of blood return and site check throughout admin

## 2021-12-27 NOTE — PROGRESS NOTES
Cancer Center Progress Note    Patient Name: Nicole Mcclendon   YOB: 1975   Medical Record Number: [id number]   CSN: [id number]   Date of visit: 12/27/2021     Chief Complaint/Reason for Visit:  Patient presents with:   Follow - Up: apn ass Asthma Brother    • Other (Other) Brother         PSORIASIS       Social History:  Social History    Socioeconomic History      Marital status:       Spouse name: Not on file      Number of children: 1      Years of education: Not on file      [employer] work). Physical Examination:  General: Patient is alert and oriented x 3, not in acute distress.   Vital Signs: Height: 171.3 cm (5' 7.44\") (12/27 0930)  Weight: 62.7 kg (138 lb 3.2 oz) (12/27 0930)  BSA (Calculated - sq m): 1.74 sq meters ( g/dL    HCT 36.5 35.0 - 48.0 %    .0 150.0 - 450.0 10(3)uL    MCV 93.1 80.0 - 100.0 fL    MCH 29.3 26.0 - 34.0 pg    MCHC 31.5 31.0 - 37.0 g/dL    RDW 14.8 %    Neutrophil Absolute Prelim 1.93 1.50 - 7.70 x10 (3) uL    Neutrophil Absolute 1.93 1.50

## 2021-12-27 NOTE — PATIENT INSTRUCTIONS
On-body Injector for Neulasta Patient Instructions       Your On-Body Injection device was applied on this day:  12/27 at this time 2 pm    Your dose of medication will start on this day: 12/28 at this time 5 pm

## 2022-01-12 ENCOUNTER — NURSE NAVIGATOR ENCOUNTER (OUTPATIENT)
Dept: HEMATOLOGY/ONCOLOGY | Facility: HOSPITAL | Age: 47
End: 2022-01-12

## 2022-01-12 NOTE — PROGRESS NOTES
Phoned patient to assist with next steps in care, including radiation consultation. Contact information provided and requested return phone call.

## 2022-01-16 NOTE — PROGRESS NOTES
[facility] Hematology Oncology Group Progress Note      Patient Name: Lissy Durbin   YOB: 1975  Medical Record Number: [id number]  Attending Physician: Jodie Lezama. Gabriel Tolentino M.D.      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  Her2 by immunohistochemistry was repeated and was 1+ (nega cancer. Social History (historical data, reviewed by physician)  Denies tobacco use.        Current Medications  Dapsone (ACZONE) 5 % External Gel, Apply to affected area BID., Disp: 1 each, Rfl: 0  ondansetron (ZOFRAN) 8 MG tablet, Take 1 tablet (8 mg METABOLIC PANEL (14)    Collection Time: 01/17/22  8:41 AM   Result Value Ref Range    Glucose 80 70 - 99 mg/dL    Sodium 141 136 - 145 mmol/L    Potassium 4.0 3.5 - 5.1 mmol/L    Chloride 109 98 - 112 mmol/L    CO2 26.0 21.0 - 32.0 mmol/L    Anion Gap 6 0 with cycle 4 of docetaxel and cyclophosphamide. This is the last planned cycle of chemotherapy. Patient has been seen by radiation oncology and adjuvant radiation therapy is recommended. We will arrange for patient to follow up for simulation.

## 2022-01-17 ENCOUNTER — OFFICE VISIT (OUTPATIENT)
Dept: HEMATOLOGY/ONCOLOGY | Facility: HOSPITAL | Age: 47
End: 2022-01-17
Attending: SPECIALIST
Payer: COMMERCIAL

## 2022-01-17 VITALS
SYSTOLIC BLOOD PRESSURE: 125 MMHG | HEART RATE: 62 BPM | RESPIRATION RATE: 18 BRPM | BODY MASS INDEX: 20.79 KG/M2 | TEMPERATURE: 98 F | WEIGHT: 134 LBS | OXYGEN SATURATION: 100 % | HEIGHT: 67.44 IN | DIASTOLIC BLOOD PRESSURE: 79 MMHG

## 2022-01-17 DIAGNOSIS — Z51.11 CHEMOTHERAPY MANAGEMENT, ENCOUNTER FOR: ICD-10-CM

## 2022-01-17 DIAGNOSIS — C50.411 MALIGNANT NEOPLASM OF UPPER-OUTER QUADRANT OF RIGHT BREAST IN FEMALE, ESTROGEN RECEPTOR POSITIVE (HCC): Primary | ICD-10-CM

## 2022-01-17 DIAGNOSIS — Z17.0 MALIGNANT NEOPLASM OF UPPER-OUTER QUADRANT OF RIGHT BREAST IN FEMALE, ESTROGEN RECEPTOR POSITIVE (HCC): Primary | ICD-10-CM

## 2022-01-17 DIAGNOSIS — D25.9 UTERINE LEIOMYOMA, UNSPECIFIED LOCATION: ICD-10-CM

## 2022-01-17 DIAGNOSIS — Z79.899 ON ANTINEOPLASTIC CHEMOTHERAPY: ICD-10-CM

## 2022-01-17 LAB
ALBUMIN SERPL-MCNC: 3.6 G/DL (ref 3.4–5)
ALBUMIN/GLOB SERPL: 1.2 {RATIO} (ref 1–2)
ALP LIVER SERPL-CCNC: 118 U/L
ALT SERPL-CCNC: 38 U/L
ANION GAP SERPL CALC-SCNC: 6 MMOL/L (ref 0–18)
AST SERPL-CCNC: 29 U/L (ref 15–37)
BASOPHILS # BLD AUTO: 0.06 X10(3) UL (ref 0–0.2)
BASOPHILS NFR BLD AUTO: 1.8 %
BILIRUB SERPL-MCNC: 0.3 MG/DL (ref 0.1–2)
BUN BLD-MCNC: 11 MG/DL (ref 7–18)
CALCIUM BLD-MCNC: 8.9 MG/DL (ref 8.5–10.1)
CHLORIDE SERPL-SCNC: 109 MMOL/L (ref 98–112)
CO2 SERPL-SCNC: 26 MMOL/L (ref 21–32)
CREAT BLD-MCNC: 0.69 MG/DL
EOSINOPHIL # BLD AUTO: 0 X10(3) UL (ref 0–0.7)
EOSINOPHIL NFR BLD AUTO: 0 %
ERYTHROCYTE [DISTWIDTH] IN BLOOD BY AUTOMATED COUNT: 14.9 %
FASTING STATUS PATIENT QL REPORTED: NO
GLOBULIN PLAS-MCNC: 3 G/DL (ref 2.8–4.4)
GLUCOSE BLD-MCNC: 80 MG/DL (ref 70–99)
HCT VFR BLD AUTO: 34 %
HGB BLD-MCNC: 10.8 G/DL
IMM GRANULOCYTES # BLD AUTO: 0.01 X10(3) UL (ref 0–1)
IMM GRANULOCYTES NFR BLD: 0.3 %
LYMPHOCYTES # BLD AUTO: 0.8 X10(3) UL (ref 1–4)
LYMPHOCYTES NFR BLD AUTO: 24.1 %
MCH RBC QN AUTO: 29.3 PG (ref 26–34)
MCHC RBC AUTO-ENTMCNC: 31.8 G/DL (ref 31–37)
MCV RBC AUTO: 92.4 FL
MONOCYTES # BLD AUTO: 0.32 X10(3) UL (ref 0.1–1)
MONOCYTES NFR BLD AUTO: 9.6 %
NEUTROPHILS # BLD AUTO: 2.13 X10 (3) UL (ref 1.5–7.7)
NEUTROPHILS # BLD AUTO: 2.13 X10(3) UL (ref 1.5–7.7)
NEUTROPHILS NFR BLD AUTO: 64.2 %
OSMOLALITY SERPL CALC.SUM OF ELEC: 290 MOSM/KG (ref 275–295)
PLATELET # BLD AUTO: 231 10(3)UL (ref 150–450)
POTASSIUM SERPL-SCNC: 4 MMOL/L (ref 3.5–5.1)
PROT SERPL-MCNC: 6.6 G/DL (ref 6.4–8.2)
RBC # BLD AUTO: 3.68 X10(6)UL
SODIUM SERPL-SCNC: 141 MMOL/L (ref 136–145)
WBC # BLD AUTO: 3.3 X10(3) UL (ref 4–11)

## 2022-01-17 PROCEDURE — 99215 OFFICE O/P EST HI 40 MIN: CPT | Performed by: SPECIALIST

## 2022-01-17 PROCEDURE — 96375 TX/PRO/DX INJ NEW DRUG ADDON: CPT

## 2022-01-17 PROCEDURE — 96417 CHEMO IV INFUS EACH ADDL SEQ: CPT

## 2022-01-17 PROCEDURE — 96413 CHEMO IV INFUSION 1 HR: CPT

## 2022-01-17 PROCEDURE — 96377 APPLICATON ON-BODY INJECTOR: CPT

## 2022-01-17 RX ORDER — DAPSONE 50 MG/G
GEL TOPICAL 2 TIMES DAILY
COMMUNITY
End: 2022-01-17

## 2022-01-17 RX ORDER — DAPSONE 50 MG/G
GEL TOPICAL
Qty: 1 EACH | Refills: 0 | Status: SHIPPED | OUTPATIENT
Start: 2022-01-17

## 2022-01-17 NOTE — PROGRESS NOTES
On-body Injector for Neulasta Patient Instructions     :   Your On-Body Injection device was applied on this day: 1/17/22 at this time 12:30pm    Your dose of medication will start on this day: 1/18/22 at this semaj

## 2022-01-17 NOTE — PROGRESS NOTES
Pt here for C4D1 Taxotere/Cytoxan.   Arrives Ambulating independently, accompanied by Self           Pregnancy screening: Denies possibility of pregnancy    Modifications in dose or schedule: No     Frequency of blood return and site check throughout admini

## 2022-01-17 NOTE — PROGRESS NOTES
Patient is here today for follow up with Isaac Cueva for Breast Cancer. C4D1 Taxotere / Cytoxan. Patient denies pain. fatigue days 4 and 5 post treatment. Nausea controlled with antiemetics.  Patient stated abdominal pain post treatment when eating large meal

## 2022-01-24 ENCOUNTER — HOSPITAL ENCOUNTER (OUTPATIENT)
Dept: RADIATION ONCOLOGY | Facility: HOSPITAL | Age: 47
Discharge: HOME | End: 2022-01-24
Attending: RADIOLOGY
Payer: COMMERCIAL

## 2022-01-24 VITALS
OXYGEN SATURATION: 99 % | DIASTOLIC BLOOD PRESSURE: 72 MMHG | RESPIRATION RATE: 20 BRPM | BODY MASS INDEX: 21 KG/M2 | SYSTOLIC BLOOD PRESSURE: 112 MMHG | TEMPERATURE: 98 F | HEART RATE: 70 BPM | WEIGHT: 133.81 LBS

## 2022-01-24 DIAGNOSIS — Z17.0 MALIGNANT NEOPLASM OF UPPER-OUTER QUADRANT OF RIGHT BREAST IN FEMALE, ESTROGEN RECEPTOR POSITIVE (HCC): Primary | ICD-10-CM

## 2022-01-24 DIAGNOSIS — C50.411 MALIGNANT NEOPLASM OF UPPER-OUTER QUADRANT OF RIGHT BREAST IN FEMALE, ESTROGEN RECEPTOR POSITIVE (HCC): Primary | ICD-10-CM

## 2022-01-24 PROCEDURE — 77334 RADIATION TREATMENT AID(S): CPT | Performed by: RADIOLOGY

## 2022-01-24 PROCEDURE — 77290 THER RAD SIMULAJ FIELD CPLX: CPT | Performed by: RADIOLOGY

## 2022-01-24 NOTE — PROGRESS NOTES
Nursing Re- Consult Note    Patient: Magalis Murphy  YOB: 1975  Age: 51year old  Anibal Gomez MD  Referring Physician: Breana Cohn  Diagnosis:No diagnosis found.   Consult Date: 1/24/2022    H

## 2022-01-24 NOTE — PROGRESS NOTES
[facility]     PATIENT:   Darby Maynard      6/10/1975    DIAGNOSIS:   R breast cancer      CANCER HISTORY   50 yo s/p lumpectomy and SNBx for rt UOQ breast cancer, deep third     -14 and 2 mm multifocal grade 2

## 2022-01-26 PROCEDURE — 77334 RADIATION TREATMENT AID(S): CPT | Performed by: RADIOLOGY

## 2022-01-26 PROCEDURE — 77300 RADIATION THERAPY DOSE PLAN: CPT | Performed by: RADIOLOGY

## 2022-01-26 PROCEDURE — 77295 3-D RADIOTHERAPY PLAN: CPT | Performed by: RADIOLOGY

## 2022-01-31 ENCOUNTER — NURSE NAVIGATOR ENCOUNTER (OUTPATIENT)
Dept: HEMATOLOGY/ONCOLOGY | Facility: HOSPITAL | Age: 47
End: 2022-01-31

## 2022-01-31 NOTE — PROGRESS NOTES
Called patient back in regards to her VM she left about being done with chemotherapy, being done with CT mapping and needing her goserelin injection scheduled.  Stated to the patient that she needs her scheduled RT appointments first before her goserelin in

## 2022-02-01 ENCOUNTER — HOSPITAL ENCOUNTER (OUTPATIENT)
Dept: RADIATION ONCOLOGY | Facility: HOSPITAL | Age: 47
Discharge: HOME | End: 2022-02-01
Attending: RADIOLOGY
Payer: COMMERCIAL

## 2022-02-02 ENCOUNTER — HOSPITAL ENCOUNTER (OUTPATIENT)
Dept: RADIATION ONCOLOGY | Facility: HOSPITAL | Age: 47
Discharge: HOME | End: 2022-02-02
Attending: RADIOLOGY
Payer: COMMERCIAL

## 2022-02-02 PROCEDURE — 77280 THER RAD SIMULAJ FIELD SMPL: CPT | Performed by: RADIOLOGY

## 2022-02-02 PROCEDURE — 77412 RADIATION TX DELIVERY LVL 3: CPT | Performed by: RADIOLOGY

## 2022-02-03 ENCOUNTER — HOSPITAL ENCOUNTER (OUTPATIENT)
Dept: RADIATION ONCOLOGY | Facility: HOSPITAL | Age: 47
Discharge: HOME | End: 2022-02-03
Attending: RADIOLOGY
Payer: COMMERCIAL

## 2022-02-03 DIAGNOSIS — Z17.0 MALIGNANT NEOPLASM OF UPPER-OUTER QUADRANT OF RIGHT BREAST IN FEMALE, ESTROGEN RECEPTOR POSITIVE (HCC): Primary | ICD-10-CM

## 2022-02-03 DIAGNOSIS — C50.411 MALIGNANT NEOPLASM OF UPPER-OUTER QUADRANT OF RIGHT BREAST IN FEMALE, ESTROGEN RECEPTOR POSITIVE (HCC): Primary | ICD-10-CM

## 2022-02-03 PROCEDURE — 77412 RADIATION TX DELIVERY LVL 3: CPT | Performed by: RADIOLOGY

## 2022-02-03 PROCEDURE — 77290 THER RAD SIMULAJ FIELD CPLX: CPT | Performed by: RADIOLOGY

## 2022-02-03 NOTE — PROGRESS NOTES
[facility] Radiation Treatment Management Note 1-5    Patient:  Madalyn Maxwell  Age:  51year old  Visit Diagnosis:    1. Malignant neoplasm of upper-outer quadrant of right breast in female, estrogen receptor positive ([facility] [address]      Primary Rad/Onc:  Dr. Rinaldo Severance    Site Delivered Dose (cGy) Prescribed Dose (cGy) Fraction #   RIGHT BREAST 534 4005 2/15   R BREAST BOOST 0 1000 0/5     First treatment date:   2/2/22  Concurrent chemotherapy:  N/A    Oncology Vitals 12/27/2021 1/17/2022 1/24/2022   Height 5' 7.441\" 5' 7.441\" -   Height 171 cm 171 cm -   Weight 138 lb 3.2 oz 134 lb 133 lb 12.8 oz   Weight 62.687 kg 60.782 kg 60.691 kg   BSA (m2) 1.74 m2 1.71 m2 1.71 m2   /59 125/79 112/72   Pulse 55 62 70   Resp 16 18 20   Temp 97.9 97.8 98.1   SpO2 100 100 99   Pain Score 0 0 0   Some recent data might be hidden        Toxicities:  Fatigue Grade 0= None  Pruritis Grade 0= None  Dry Skin absent  Dermatitis associated with radiation Grade 0= None  Moisturizer used Vanicream and Aquaphor applied Number of times: 2 times daily. Hyperpigmentation absent    Nursing Note:  RN ED done with pt  Reviewed potential side effects of RT and management  Aquaphor/Vanicream samples given. Questions addressed, pt verbalized understanding. Malcolm Banerjee, RN    Physician Note:  Subjective:  Started this week, no issues      Objective:  No changes      Treatment setup imaging have been reviewed:   Yes    Assessment/Plan:  Cont RT  otv 1 wk    Dr. Rinaldo Severance

## 2022-02-04 PROCEDURE — 77412 RADIATION TX DELIVERY LVL 3: CPT | Performed by: RADIOLOGY

## 2022-02-07 PROCEDURE — 77412 RADIATION TX DELIVERY LVL 3: CPT | Performed by: RADIOLOGY

## 2022-02-08 ENCOUNTER — OFFICE VISIT (OUTPATIENT)
Dept: HEMATOLOGY/ONCOLOGY | Facility: HOSPITAL | Age: 47
End: 2022-02-08
Attending: SPECIALIST
Payer: COMMERCIAL

## 2022-02-08 DIAGNOSIS — Z17.0 MALIGNANT NEOPLASM OF UPPER-OUTER QUADRANT OF RIGHT BREAST IN FEMALE, ESTROGEN RECEPTOR POSITIVE (HCC): ICD-10-CM

## 2022-02-08 DIAGNOSIS — E28.39 SUPPRESSION OF OVARIAN SECRETION: Primary | ICD-10-CM

## 2022-02-08 DIAGNOSIS — C50.411 MALIGNANT NEOPLASM OF UPPER-OUTER QUADRANT OF RIGHT BREAST IN FEMALE, ESTROGEN RECEPTOR POSITIVE (HCC): ICD-10-CM

## 2022-02-08 PROCEDURE — 77417 THER RADIOLOGY PORT IMAGE(S): CPT | Performed by: RADIOLOGY

## 2022-02-08 PROCEDURE — 96402 CHEMO HORMON ANTINEOPL SQ/IM: CPT

## 2022-02-08 PROCEDURE — 77412 RADIATION TX DELIVERY LVL 3: CPT | Performed by: RADIOLOGY

## 2022-02-08 NOTE — PROGRESS NOTES
Education Record    Learner:  Patient    Disease / Felix Arroyo of ovarian secretion     Barriers / Limitations:  None   Comments:    Method:  Brief focused   Comments:    General Topics:  Infection, Medication, Pain, Precautions, Procedure, Side effects and symptom management, Plan of care reviewed and Fall risk and prevention   Comments:    Outcome:  Shows understanding   Comments: Tolerated well. Given AVS with reading material

## 2022-02-09 PROCEDURE — 77412 RADIATION TX DELIVERY LVL 3: CPT | Performed by: RADIOLOGY

## 2022-02-10 ENCOUNTER — HOSPITAL ENCOUNTER (OUTPATIENT)
Dept: RADIATION ONCOLOGY | Facility: HOSPITAL | Age: 47
Discharge: HOME | End: 2022-02-10
Attending: RADIOLOGY
Payer: COMMERCIAL

## 2022-02-10 VITALS
SYSTOLIC BLOOD PRESSURE: 122 MMHG | HEART RATE: 65 BPM | RESPIRATION RATE: 16 BRPM | OXYGEN SATURATION: 100 % | DIASTOLIC BLOOD PRESSURE: 80 MMHG | TEMPERATURE: 99 F

## 2022-02-10 DIAGNOSIS — Z17.0 MALIGNANT NEOPLASM OF UPPER-OUTER QUADRANT OF RIGHT BREAST IN FEMALE, ESTROGEN RECEPTOR POSITIVE (HCC): Primary | ICD-10-CM

## 2022-02-10 DIAGNOSIS — C50.411 MALIGNANT NEOPLASM OF UPPER-OUTER QUADRANT OF RIGHT BREAST IN FEMALE, ESTROGEN RECEPTOR POSITIVE (HCC): Primary | ICD-10-CM

## 2022-02-10 PROCEDURE — 77412 RADIATION TX DELIVERY LVL 3: CPT | Performed by: RADIOLOGY

## 2022-02-10 NOTE — PROGRESS NOTES
[facility] Radiation Treatment Management Note 6-10    Patient:  Frances Guy  Age:  51year old  Visit Diagnosis:    1. Malignant neoplasm of upper-outer quadrant of right breast in female, estrogen receptor positive ([facility] [address]      Primary Rad/Onc:  Dr. Shelbie Ward    Site Delivered Dose (cGy) Prescribed Dose (cGy) Fraction #   RIGHT BREAST 1869 4005 7/15   R BREAST BOOST 0 1000 0/5     First treatment date:   2/2/22  Concurrent chemotherapy:  N/A    Oncology Vitals 1/17/2022 1/24/2022 2/10/2022   Height 5' 7.441\" - -   Height 171 cm - -   Weight 134 lb 133 lb 12.8 oz -   Weight 60.782 kg 60.691 kg -   BSA (m2) 1.71 m2 1.71 m2 -   /79 112/72 122/80   Pulse 62 70 65   Resp 18 20 16   Temp 97.8 98.1 98.7   SpO2 100 99 100   Pain Score 0 0 0   Some recent data might be hidden        Toxicities:  Fatigue Grade 0= None  Pruritis Grade 0= None  Dry Skin noted  Dermatitis associated with radiation Grade 1= Faint erythema or dry desquamation  Moisturizer used Aquaphor and Eucerin applied Number of times: 2 times daily. Hyperpigmentation absent    Nursing Note:  Pt feels well. Using Eucerin/Aquaphor to R breast.  Good ROM to RUE. Everton Blanchard, RN    Physician Note:  Subjective:  Doing well; some tingling near SN bx site      Objective:  No changes      Treatment setup imaging have been reviewed:   Yes    Assessment/Plan:        Continue radiotherapy per plan    Next visit:  1 week    Dr. Shelbie Ward

## 2022-02-11 PROCEDURE — 77412 RADIATION TX DELIVERY LVL 3: CPT | Performed by: RADIOLOGY

## 2022-02-11 PROCEDURE — 77336 RADIATION PHYSICS CONSULT: CPT | Performed by: RADIOLOGY

## 2022-02-14 PROCEDURE — 77412 RADIATION TX DELIVERY LVL 3: CPT | Performed by: RADIOLOGY

## 2022-02-15 PROCEDURE — 77417 THER RADIOLOGY PORT IMAGE(S): CPT | Performed by: RADIOLOGY

## 2022-02-15 PROCEDURE — 77412 RADIATION TX DELIVERY LVL 3: CPT | Performed by: RADIOLOGY

## 2022-02-16 PROCEDURE — 77412 RADIATION TX DELIVERY LVL 3: CPT | Performed by: RADIOLOGY

## 2022-02-17 ENCOUNTER — HOSPITAL ENCOUNTER (OUTPATIENT)
Dept: RADIATION ONCOLOGY | Facility: HOSPITAL | Age: 47
Discharge: HOME | End: 2022-02-17
Attending: RADIOLOGY
Payer: COMMERCIAL

## 2022-02-17 VITALS
TEMPERATURE: 98 F | OXYGEN SATURATION: 99 % | HEART RATE: 59 BPM | DIASTOLIC BLOOD PRESSURE: 69 MMHG | RESPIRATION RATE: 18 BRPM | SYSTOLIC BLOOD PRESSURE: 112 MMHG

## 2022-02-17 DIAGNOSIS — C50.411 MALIGNANT NEOPLASM OF UPPER-OUTER QUADRANT OF RIGHT BREAST IN FEMALE, ESTROGEN RECEPTOR POSITIVE (HCC): Primary | ICD-10-CM

## 2022-02-17 DIAGNOSIS — Z17.0 MALIGNANT NEOPLASM OF UPPER-OUTER QUADRANT OF RIGHT BREAST IN FEMALE, ESTROGEN RECEPTOR POSITIVE (HCC): Primary | ICD-10-CM

## 2022-02-17 PROCEDURE — 77412 RADIATION TX DELIVERY LVL 3: CPT | Performed by: RADIOLOGY

## 2022-02-17 NOTE — PROGRESS NOTES
[facility] Radiation Treatment Management Note 11-15    Patient:  Merline Lapping  Age:  51year old  Visit Diagnosis:    1. Malignant neoplasm of upper-outer quadrant of right breast in female, estrogen receptor positive ([facility] [address]      Primary Rad/Onc:  Dr. Eric Lockett    Site Delivered Dose (cGy) Prescribed Dose (cGy) Fraction #   Right breast 3204 4005 12/15   Right breast boost  1000 0/5     First treatment date:   2.2.2022  Concurrent chemotherapy:  NA    Oncology Vitals 1/24/2022 2/10/2022 2/17/2022   Height - - -   Height - - -   Weight 133 lb 12.8 oz - -   Weight 60.691 kg - -   BSA (m2) 1.71 m2 - -   /72 122/80 112/69   Pulse 70 65 59   Resp 20 16 18   Temp 98.1 98.7 98.2   SpO2 99 100 99   Pain Score 0 0 0   Some recent data might be hidden        Toxicities:  Fatigue Grade 0= None  Pruritis Grade 0= None  Dry Skin absent  Dermatitis associated with radiation Grade 0= None  Moisturizer used Eucerin applied Number of times: 2 times daily. Hyperpigmentation absent      Nursing Note:  SYMONE Slade, RN    Physician Note:  Subjective:  Doing well  Some tightness along lat muscle from sn bx       Objective:  Mild redness      Treatment setup imaging have been reviewed:   Yes    Assessment/Plan:        Continue radiotherapy per plan    Next visit:  1 week    Dr. Eric Lockett

## 2022-02-18 PROCEDURE — 77336 RADIATION PHYSICS CONSULT: CPT | Performed by: RADIOLOGY

## 2022-02-18 PROCEDURE — 77412 RADIATION TX DELIVERY LVL 3: CPT | Performed by: RADIOLOGY

## 2022-02-21 PROCEDURE — 77412 RADIATION TX DELIVERY LVL 3: CPT | Performed by: RADIOLOGY

## 2022-02-21 PROCEDURE — 77417 THER RADIOLOGY PORT IMAGE(S): CPT | Performed by: RADIOLOGY

## 2022-02-22 ENCOUNTER — APPOINTMENT (OUTPATIENT)
Dept: RADIATION ONCOLOGY | Facility: HOSPITAL | Age: 47
End: 2022-02-22
Attending: RADIOLOGY
Payer: COMMERCIAL

## 2022-02-22 PROCEDURE — 77412 RADIATION TX DELIVERY LVL 3: CPT | Performed by: RADIOLOGY

## 2022-02-22 PROCEDURE — 77334 RADIATION TREATMENT AID(S): CPT | Performed by: RADIOLOGY

## 2022-02-22 PROCEDURE — 77290 THER RAD SIMULAJ FIELD CPLX: CPT | Performed by: RADIOLOGY

## 2022-02-22 PROCEDURE — 77307 TELETHX ISODOSE PLAN CPLX: CPT | Performed by: RADIOLOGY

## 2022-02-23 ENCOUNTER — HOSPITAL ENCOUNTER (OUTPATIENT)
Dept: RADIATION ONCOLOGY | Facility: HOSPITAL | Age: 47
Discharge: HOME | End: 2022-02-23
Attending: RADIOLOGY
Payer: COMMERCIAL

## 2022-02-23 PROCEDURE — 77412 RADIATION TX DELIVERY LVL 3: CPT | Performed by: RADIOLOGY

## 2022-02-23 PROCEDURE — 77280 THER RAD SIMULAJ FIELD SMPL: CPT | Performed by: RADIOLOGY

## 2022-02-24 ENCOUNTER — HOSPITAL ENCOUNTER (OUTPATIENT)
Dept: RADIATION ONCOLOGY | Facility: HOSPITAL | Age: 47
Discharge: HOME | End: 2022-02-24
Attending: RADIOLOGY
Payer: COMMERCIAL

## 2022-02-24 VITALS
BODY MASS INDEX: 21 KG/M2 | WEIGHT: 136.38 LBS | RESPIRATION RATE: 18 BRPM | SYSTOLIC BLOOD PRESSURE: 120 MMHG | TEMPERATURE: 98 F | OXYGEN SATURATION: 99 % | DIASTOLIC BLOOD PRESSURE: 71 MMHG | HEART RATE: 60 BPM

## 2022-02-24 DIAGNOSIS — Z17.0 MALIGNANT NEOPLASM OF UPPER-OUTER QUADRANT OF RIGHT BREAST IN FEMALE, ESTROGEN RECEPTOR POSITIVE (HCC): Primary | ICD-10-CM

## 2022-02-24 DIAGNOSIS — C50.411 MALIGNANT NEOPLASM OF UPPER-OUTER QUADRANT OF RIGHT BREAST IN FEMALE, ESTROGEN RECEPTOR POSITIVE (HCC): Primary | ICD-10-CM

## 2022-02-24 PROCEDURE — 77412 RADIATION TX DELIVERY LVL 3: CPT | Performed by: RADIOLOGY

## 2022-02-24 PROCEDURE — 77387 GUIDANCE FOR RADJ TX DLVR: CPT | Performed by: RADIOLOGY

## 2022-02-24 NOTE — PATIENT INSTRUCTIONS
POST-RADIATION INSTRUCTIONS:   - FOLLOW-UP WITH DR. MYERS IN June OF 2022. OUR  WILL CALL YOU.   - FOLLOW-UP WITH DR. Benedict Aschoff   - SIDE EFFECTS OF RADIATION WILL GRADUALLY SUBSIDE, IT MAY TAKE UP TO 2 WEEKS POST-RADIATION FOR YOU TO NOTICE CHANGES; SUCH AS A DECREASE IN YOUR FATIGUE LEVEL.   - CONTINUE WITH SKIN CARE: APPLY MILD CREAM TO TREATED AREA 2-3X/DAY, DO NOT USE HOT/COLD PACKS ON SITE, USE MILD SOAP/DEODORANT TO AFFECTED AREA, KEEP OPEN TO AIR WHEN AT HOME.    - CALL [phone] IF YOU HAVE ANY QUESTIONS/CONCERNS REGARDING RADIATION THERAPY

## 2022-02-25 PROCEDURE — 77336 RADIATION PHYSICS CONSULT: CPT | Performed by: RADIOLOGY

## 2022-02-25 PROCEDURE — 77412 RADIATION TX DELIVERY LVL 3: CPT | Performed by: RADIOLOGY

## 2022-02-25 PROCEDURE — 77387 GUIDANCE FOR RADJ TX DLVR: CPT | Performed by: RADIOLOGY

## 2022-02-28 PROCEDURE — 77387 GUIDANCE FOR RADJ TX DLVR: CPT | Performed by: RADIOLOGY

## 2022-02-28 PROCEDURE — 77412 RADIATION TX DELIVERY LVL 3: CPT | Performed by: RADIOLOGY

## 2022-03-01 ENCOUNTER — DOCUMENTATION ONLY (OUTPATIENT)
Dept: RADIATION ONCOLOGY | Facility: HOSPITAL | Age: 47
End: 2022-03-01

## 2022-03-01 ENCOUNTER — HOSPITAL ENCOUNTER (OUTPATIENT)
Dept: RADIATION ONCOLOGY | Facility: HOSPITAL | Age: 47
Discharge: HOME | End: 2022-03-01
Attending: RADIOLOGY
Payer: COMMERCIAL

## 2022-03-01 PROCEDURE — 77387 GUIDANCE FOR RADJ TX DLVR: CPT | Performed by: RADIOLOGY

## 2022-03-01 PROCEDURE — 77412 RADIATION TX DELIVERY LVL 3: CPT | Performed by: RADIOLOGY

## 2022-03-07 ENCOUNTER — HOSPITAL ENCOUNTER (OUTPATIENT)
Dept: ULTRASOUND IMAGING | Age: 47
Discharge: HOME | End: 2022-03-07
Attending: SPECIALIST
Payer: COMMERCIAL

## 2022-03-07 DIAGNOSIS — D25.9 UTERINE LEIOMYOMA, UNSPECIFIED LOCATION: ICD-10-CM

## 2022-03-07 PROCEDURE — 76830 TRANSVAGINAL US NON-OB: CPT | Performed by: SPECIALIST

## 2022-03-07 PROCEDURE — 76856 US EXAM PELVIC COMPLETE: CPT | Performed by: SPECIALIST

## 2022-04-12 ENCOUNTER — OFFICE VISIT (OUTPATIENT)
Dept: HEMATOLOGY/ONCOLOGY | Facility: HOSPITAL | Age: 47
End: 2022-04-12
Attending: SPECIALIST
Payer: COMMERCIAL

## 2022-04-12 ENCOUNTER — OFFICE VISIT (OUTPATIENT)
Dept: SURGERY | Facility: CLINIC | Age: 47
End: 2022-04-12
Payer: COMMERCIAL

## 2022-04-12 VITALS
WEIGHT: 136 LBS | DIASTOLIC BLOOD PRESSURE: 84 MMHG | TEMPERATURE: 98 F | SYSTOLIC BLOOD PRESSURE: 131 MMHG | HEART RATE: 60 BPM | OXYGEN SATURATION: 100 % | BODY MASS INDEX: 21 KG/M2 | RESPIRATION RATE: 18 BRPM

## 2022-04-12 DIAGNOSIS — C50.411 MALIGNANT NEOPLASM OF UPPER-OUTER QUADRANT OF RIGHT BREAST IN FEMALE, ESTROGEN RECEPTOR POSITIVE (HCC): Primary | ICD-10-CM

## 2022-04-12 DIAGNOSIS — Z17.0 MALIGNANT NEOPLASM OF UPPER-OUTER QUADRANT OF RIGHT BREAST IN FEMALE, ESTROGEN RECEPTOR POSITIVE (HCC): Primary | ICD-10-CM

## 2022-04-12 PROCEDURE — 99213 OFFICE O/P EST LOW 20 MIN: CPT | Performed by: SURGERY

## 2022-04-13 ENCOUNTER — PATIENT OUTREACH (OUTPATIENT)
Dept: SURGERY | Facility: CLINIC | Age: 47
End: 2022-04-13

## 2022-04-19 ENCOUNTER — OFFICE VISIT (OUTPATIENT)
Dept: HEMATOLOGY/ONCOLOGY | Facility: HOSPITAL | Age: 47
End: 2022-04-19
Attending: SPECIALIST
Payer: COMMERCIAL

## 2022-04-19 VITALS
BODY MASS INDEX: 21 KG/M2 | WEIGHT: 137.5 LBS | TEMPERATURE: 98 F | DIASTOLIC BLOOD PRESSURE: 79 MMHG | RESPIRATION RATE: 18 BRPM | SYSTOLIC BLOOD PRESSURE: 133 MMHG | OXYGEN SATURATION: 100 % | HEART RATE: 66 BPM

## 2022-04-19 DIAGNOSIS — E28.39 SUPPRESSION OF OVARIAN SECRETION: Primary | ICD-10-CM

## 2022-04-19 DIAGNOSIS — D25.9 UTERINE LEIOMYOMA, UNSPECIFIED LOCATION: ICD-10-CM

## 2022-04-19 DIAGNOSIS — C50.411 MALIGNANT NEOPLASM OF UPPER-OUTER QUADRANT OF RIGHT BREAST IN FEMALE, ESTROGEN RECEPTOR POSITIVE (HCC): ICD-10-CM

## 2022-04-19 DIAGNOSIS — Z17.0 MALIGNANT NEOPLASM OF UPPER-OUTER QUADRANT OF RIGHT BREAST IN FEMALE, ESTROGEN RECEPTOR POSITIVE (HCC): ICD-10-CM

## 2022-04-19 DIAGNOSIS — Z78.0 MENOPAUSE: ICD-10-CM

## 2022-04-19 LAB — VIT D+METAB SERPL-MCNC: 39 NG/ML (ref 30–100)

## 2022-04-19 PROCEDURE — 82306 VITAMIN D 25 HYDROXY: CPT

## 2022-04-19 PROCEDURE — 36415 COLL VENOUS BLD VENIPUNCTURE: CPT

## 2022-04-19 PROCEDURE — 96402 CHEMO HORMON ANTINEOPL SQ/IM: CPT

## 2022-04-19 PROCEDURE — 99214 OFFICE O/P EST MOD 30 MIN: CPT | Performed by: SPECIALIST

## 2022-04-19 RX ORDER — ANASTROZOLE 1 MG/1
1 TABLET ORAL DAILY
Qty: 90 TABLET | Refills: 1 | Status: SHIPPED | OUTPATIENT
Start: 2022-04-19

## 2022-04-19 NOTE — PROGRESS NOTES
Education Record    Learner:  Patient    Disease / Diagnosis: breast cancer - ovarian suppression    Barriers / Limitations:  None   Comments:    Method:  Brief focused   Comments:    General Topics:  Plan of care reviewed   Comments:    Outcome:  Shows understanding   Comments:    zoladex injection given. Should be receiving monthly. appt made for 1 month. avs printed and provided to patient.

## 2022-04-19 NOTE — PROGRESS NOTES
Patient is here today for follow up with Sandhya John for Breast Cancer. Radiation therapy was completed the first week of March 2022. Zoladex therapy. Patient denies pain. Stated she is feeling good. Medication list and medical history were reviewed and updated. Education Record    Learner:  Patient    Disease / Diagnosis: Breast cancer  Barriers / Limitations:  None   Comments:    Method:  Brief focused, Discussion, Printed material and Reinforcement   Comments:    General Topics:  Medication, Pain, Procedure and Plan of care reviewed   Comments:    Outcome:  Shows understanding   Comments:   [facility] notes on Anastrozole given. Per Dr. Harmon March Zoladex injection today. AVS provided and follow up reviewed. Patient instructed to call as needed.

## 2022-04-20 ENCOUNTER — HOSPITAL ENCOUNTER (OUTPATIENT)
Dept: BONE DENSITY | Age: 47
Discharge: HOME | End: 2022-04-20
Attending: SPECIALIST
Payer: COMMERCIAL

## 2022-04-20 DIAGNOSIS — Z78.0 MENOPAUSE: ICD-10-CM

## 2022-04-20 PROCEDURE — 77080 DXA BONE DENSITY AXIAL: CPT | Performed by: SPECIALIST

## 2022-05-16 ENCOUNTER — OFFICE VISIT (OUTPATIENT)
Dept: HEMATOLOGY/ONCOLOGY | Facility: HOSPITAL | Age: 47
End: 2022-05-16
Attending: SPECIALIST
Payer: COMMERCIAL

## 2022-05-16 VITALS
HEIGHT: 67.44 IN | RESPIRATION RATE: 16 BRPM | SYSTOLIC BLOOD PRESSURE: 158 MMHG | TEMPERATURE: 97 F | OXYGEN SATURATION: 100 % | WEIGHT: 138.81 LBS | BODY MASS INDEX: 21.53 KG/M2 | HEART RATE: 53 BPM | DIASTOLIC BLOOD PRESSURE: 81 MMHG

## 2022-05-16 DIAGNOSIS — Z17.0 MALIGNANT NEOPLASM OF UPPER-OUTER QUADRANT OF RIGHT BREAST IN FEMALE, ESTROGEN RECEPTOR POSITIVE (HCC): ICD-10-CM

## 2022-05-16 DIAGNOSIS — E28.39 SUPPRESSION OF OVARIAN SECRETION: Primary | ICD-10-CM

## 2022-05-16 DIAGNOSIS — C50.411 MALIGNANT NEOPLASM OF UPPER-OUTER QUADRANT OF RIGHT BREAST IN FEMALE, ESTROGEN RECEPTOR POSITIVE (HCC): ICD-10-CM

## 2022-05-16 PROCEDURE — 96402 CHEMO HORMON ANTINEOPL SQ/IM: CPT

## 2022-05-16 PROCEDURE — 99214 OFFICE O/P EST MOD 30 MIN: CPT | Performed by: SPECIALIST

## 2022-05-16 NOTE — PROGRESS NOTES
Education Record    Learner:  Patient    Disease / Diagnosis: breast cancer: zoladex     Barriers / Limitations:  None   Comments:    Method:  Brief focused and Discussion   Comments:    General Topics:  Side effects and symptom management and Plan of care reviewed   Comments:    Outcome:  Shows understanding   Comments:

## 2022-06-13 ENCOUNTER — OFFICE VISIT (OUTPATIENT)
Dept: HEMATOLOGY/ONCOLOGY | Facility: HOSPITAL | Age: 47
End: 2022-06-13
Attending: SPECIALIST
Payer: COMMERCIAL

## 2022-06-13 VITALS
HEART RATE: 63 BPM | DIASTOLIC BLOOD PRESSURE: 86 MMHG | TEMPERATURE: 99 F | HEIGHT: 67.44 IN | SYSTOLIC BLOOD PRESSURE: 136 MMHG | RESPIRATION RATE: 16 BRPM | OXYGEN SATURATION: 98 % | WEIGHT: 133 LBS | BODY MASS INDEX: 20.63 KG/M2

## 2022-06-13 DIAGNOSIS — E28.39 SUPPRESSION OF OVARIAN SECRETION: Primary | ICD-10-CM

## 2022-06-13 DIAGNOSIS — C50.411 MALIGNANT NEOPLASM OF UPPER-OUTER QUADRANT OF RIGHT BREAST IN FEMALE, ESTROGEN RECEPTOR POSITIVE (HCC): ICD-10-CM

## 2022-06-13 DIAGNOSIS — Z17.0 MALIGNANT NEOPLASM OF UPPER-OUTER QUADRANT OF RIGHT BREAST IN FEMALE, ESTROGEN RECEPTOR POSITIVE (HCC): ICD-10-CM

## 2022-06-13 PROCEDURE — 96402 CHEMO HORMON ANTINEOPL SQ/IM: CPT

## 2022-06-13 NOTE — PROGRESS NOTES
Education Record    Learner:  Patient     Disease / Diagnosis: breast cancer: zoladex     Barriers / Limitations:  None   Comments:    Method:  Brief focused and Discussion   Comments:    General Topics:  Precautions and Side effects and symptom management   Comments:    Outcome:  Shows understanding   Comments:

## 2022-06-22 ENCOUNTER — HOSPITAL ENCOUNTER (OUTPATIENT)
Dept: RADIATION ONCOLOGY | Facility: HOSPITAL | Age: 47
Discharge: HOME | End: 2022-06-22
Attending: RADIOLOGY
Payer: COMMERCIAL

## 2022-06-22 VITALS
SYSTOLIC BLOOD PRESSURE: 141 MMHG | HEART RATE: 58 BPM | DIASTOLIC BLOOD PRESSURE: 83 MMHG | WEIGHT: 136 LBS | OXYGEN SATURATION: 98 % | BODY MASS INDEX: 21 KG/M2 | RESPIRATION RATE: 16 BRPM | TEMPERATURE: 98 F

## 2022-06-22 DIAGNOSIS — Z17.0 MALIGNANT NEOPLASM OF UPPER-OUTER QUADRANT OF RIGHT BREAST IN FEMALE, ESTROGEN RECEPTOR POSITIVE (HCC): Primary | ICD-10-CM

## 2022-06-22 DIAGNOSIS — C50.411 MALIGNANT NEOPLASM OF UPPER-OUTER QUADRANT OF RIGHT BREAST IN FEMALE, ESTROGEN RECEPTOR POSITIVE (HCC): Primary | ICD-10-CM

## 2022-06-22 PROCEDURE — 99213 OFFICE O/P EST LOW 20 MIN: CPT

## 2022-06-22 NOTE — PATIENT INSTRUCTIONS
- FOLLOW-UP WITH DR. MYERS ONLY A NEEDED      - CALL [phone] IF YOU HAVE ANY QUESTIONS/CONCERNS REGARDING RADIATION THERAPY

## 2022-06-24 RX ORDER — BIOTIN 1 MG
1 TABLET ORAL DAILY
COMMUNITY

## 2022-06-24 RX ORDER — B-COMPLEX WITH VITAMIN C
TABLET ORAL DAILY
COMMUNITY

## 2022-06-25 ENCOUNTER — LABORATORY ENCOUNTER (OUTPATIENT)
Dept: LAB | Facility: HOSPITAL | Age: 47
End: 2022-06-25
Attending: OBSTETRICS & GYNECOLOGY
Payer: COMMERCIAL

## 2022-06-25 DIAGNOSIS — C50.919: ICD-10-CM

## 2022-06-25 LAB
ALBUMIN SERPL-MCNC: 3.7 G/DL (ref 3.4–5)
ALBUMIN/GLOB SERPL: 1.3 {RATIO} (ref 1–2)
ALP LIVER SERPL-CCNC: 55 U/L
ALT SERPL-CCNC: 38 U/L
ANION GAP SERPL CALC-SCNC: 3 MMOL/L (ref 0–18)
AST SERPL-CCNC: 22 U/L (ref 15–37)
BASOPHILS # BLD AUTO: 0.03 X10(3) UL (ref 0–0.2)
BASOPHILS NFR BLD AUTO: 1.1 %
BILIRUB SERPL-MCNC: 0.6 MG/DL (ref 0.1–2)
BUN BLD-MCNC: 13 MG/DL (ref 7–18)
CALCIUM BLD-MCNC: 9.2 MG/DL (ref 8.5–10.1)
CHLORIDE SERPL-SCNC: 107 MMOL/L (ref 98–112)
CO2 SERPL-SCNC: 31 MMOL/L (ref 21–32)
CREAT BLD-MCNC: 0.63 MG/DL
EOSINOPHIL # BLD AUTO: 0 X10(3) UL (ref 0–0.7)
EOSINOPHIL NFR BLD AUTO: 0 %
ERYTHROCYTE [DISTWIDTH] IN BLOOD BY AUTOMATED COUNT: 13.7 %
FASTING STATUS PATIENT QL REPORTED: YES
GLOBULIN PLAS-MCNC: 2.8 G/DL (ref 2.8–4.4)
GLUCOSE BLD-MCNC: 86 MG/DL (ref 70–99)
HCT VFR BLD AUTO: 39.1 %
HGB BLD-MCNC: 12.8 G/DL
IMM GRANULOCYTES # BLD AUTO: 0.01 X10(3) UL (ref 0–1)
IMM GRANULOCYTES NFR BLD: 0.4 %
LYMPHOCYTES # BLD AUTO: 1 X10(3) UL (ref 1–4)
LYMPHOCYTES NFR BLD AUTO: 36.6 %
MCH RBC QN AUTO: 28.6 PG (ref 26–34)
MCHC RBC AUTO-ENTMCNC: 32.7 G/DL (ref 31–37)
MCV RBC AUTO: 87.5 FL
MONOCYTES # BLD AUTO: 0.29 X10(3) UL (ref 0.1–1)
MONOCYTES NFR BLD AUTO: 10.6 %
NEUTROPHILS # BLD AUTO: 1.4 X10 (3) UL (ref 1.5–7.7)
NEUTROPHILS # BLD AUTO: 1.4 X10(3) UL (ref 1.5–7.7)
NEUTROPHILS NFR BLD AUTO: 51.3 %
OSMOLALITY SERPL CALC.SUM OF ELEC: 291 MOSM/KG (ref 275–295)
PLATELET # BLD AUTO: 171 10(3)UL (ref 150–450)
POTASSIUM SERPL-SCNC: 3.6 MMOL/L (ref 3.5–5.1)
PROT SERPL-MCNC: 6.5 G/DL (ref 6.4–8.2)
RBC # BLD AUTO: 4.47 X10(6)UL
SODIUM SERPL-SCNC: 141 MMOL/L (ref 136–145)
WBC # BLD AUTO: 2.7 X10(3) UL (ref 4–11)

## 2022-06-25 PROCEDURE — 80053 COMPREHEN METABOLIC PANEL: CPT

## 2022-06-25 PROCEDURE — 85025 COMPLETE CBC W/AUTO DIFF WBC: CPT

## 2022-06-25 PROCEDURE — 36415 COLL VENOUS BLD VENIPUNCTURE: CPT

## 2022-06-28 ENCOUNTER — ANESTHESIA EVENT (OUTPATIENT)
Dept: SURGERY | Facility: HOSPITAL | Age: 47
End: 2022-06-28
Payer: COMMERCIAL

## 2022-06-29 ENCOUNTER — ANESTHESIA (OUTPATIENT)
Dept: SURGERY | Facility: HOSPITAL | Age: 47
End: 2022-06-29
Payer: COMMERCIAL

## 2022-06-29 ENCOUNTER — HOSPITAL ENCOUNTER (OUTPATIENT)
Facility: HOSPITAL | Age: 47
Setting detail: HOSPITAL OUTPATIENT SURGERY
Discharge: HOME | End: 2022-06-29
Attending: OBSTETRICS & GYNECOLOGY | Admitting: OBSTETRICS & GYNECOLOGY
Payer: COMMERCIAL

## 2022-06-29 VITALS
TEMPERATURE: 98 F | WEIGHT: 135.69 LBS | RESPIRATION RATE: 16 BRPM | BODY MASS INDEX: 21.05 KG/M2 | HEART RATE: 91 BPM | HEIGHT: 67.5 IN | SYSTOLIC BLOOD PRESSURE: 133 MMHG | DIASTOLIC BLOOD PRESSURE: 76 MMHG | OXYGEN SATURATION: 100 %

## 2022-06-29 DIAGNOSIS — C50.919: ICD-10-CM

## 2022-06-29 DIAGNOSIS — Z20.822 ENCOUNTER FOR PREOPERATIVE SCREENING LABORATORY TESTING FOR COVID-19 VIRUS: Primary | ICD-10-CM

## 2022-06-29 DIAGNOSIS — Z01.812 ENCOUNTER FOR PREOPERATIVE SCREENING LABORATORY TESTING FOR COVID-19 VIRUS: Primary | ICD-10-CM

## 2022-06-29 LAB — B-HCG UR QL: NEGATIVE

## 2022-06-29 PROCEDURE — 0UT74ZZ RESECTION OF BILATERAL FALLOPIAN TUBES, PERCUTANEOUS ENDOSCOPIC APPROACH: ICD-10-PCS | Performed by: OBSTETRICS & GYNECOLOGY

## 2022-06-29 PROCEDURE — 0UT24ZZ RESECTION OF BILATERAL OVARIES, PERCUTANEOUS ENDOSCOPIC APPROACH: ICD-10-PCS | Performed by: OBSTETRICS & GYNECOLOGY

## 2022-06-29 PROCEDURE — 81025 URINE PREGNANCY TEST: CPT

## 2022-06-29 RX ORDER — ACETAMINOPHEN 500 MG
1000 TABLET ORAL ONCE
Status: DISCONTINUED | OUTPATIENT
Start: 2022-06-29 | End: 2022-06-29

## 2022-06-29 RX ORDER — IBUPROFEN 600 MG/1
600 TABLET ORAL EVERY 6 HOURS PRN
Qty: 30 TABLET | Refills: 0 | Status: SHIPPED | OUTPATIENT
Start: 2022-06-29

## 2022-06-29 RX ORDER — LIDOCAINE HYDROCHLORIDE 10 MG/ML
INJECTION, SOLUTION EPIDURAL; INFILTRATION; INTRACAUDAL; PERINEURAL AS NEEDED
Status: DISCONTINUED | OUTPATIENT
Start: 2022-06-29 | End: 2022-06-29 | Stop reason: SURG

## 2022-06-29 RX ORDER — NALOXONE HYDROCHLORIDE 0.4 MG/ML
80 INJECTION, SOLUTION INTRAMUSCULAR; INTRAVENOUS; SUBCUTANEOUS AS NEEDED
Status: DISCONTINUED | OUTPATIENT
Start: 2022-06-29 | End: 2022-06-29

## 2022-06-29 RX ORDER — HYDROMORPHONE HYDROCHLORIDE 1 MG/ML
0.4 INJECTION, SOLUTION INTRAMUSCULAR; INTRAVENOUS; SUBCUTANEOUS EVERY 5 MIN PRN
Status: DISCONTINUED | OUTPATIENT
Start: 2022-06-29 | End: 2022-06-29

## 2022-06-29 RX ORDER — ONDANSETRON 2 MG/ML
4 INJECTION INTRAMUSCULAR; INTRAVENOUS EVERY 6 HOURS PRN
Status: DISCONTINUED | OUTPATIENT
Start: 2022-06-29 | End: 2022-06-29

## 2022-06-29 RX ORDER — DIPHENHYDRAMINE HYDROCHLORIDE 50 MG/ML
12.5 INJECTION INTRAMUSCULAR; INTRAVENOUS AS NEEDED
Status: DISCONTINUED | OUTPATIENT
Start: 2022-06-29 | End: 2022-06-29

## 2022-06-29 RX ORDER — KETOROLAC TROMETHAMINE 30 MG/ML
INJECTION, SOLUTION INTRAMUSCULAR; INTRAVENOUS AS NEEDED
Status: DISCONTINUED | OUTPATIENT
Start: 2022-06-29 | End: 2022-06-29 | Stop reason: SURG

## 2022-06-29 RX ORDER — NEOSTIGMINE METHYLSULFATE 1 MG/ML
INJECTION, SOLUTION INTRAVENOUS AS NEEDED
Status: DISCONTINUED | OUTPATIENT
Start: 2022-06-29 | End: 2022-06-29 | Stop reason: SURG

## 2022-06-29 RX ORDER — ROCURONIUM BROMIDE 10 MG/ML
INJECTION, SOLUTION INTRAVENOUS AS NEEDED
Status: DISCONTINUED | OUTPATIENT
Start: 2022-06-29 | End: 2022-06-29 | Stop reason: SURG

## 2022-06-29 RX ORDER — HYDROMORPHONE HYDROCHLORIDE 1 MG/ML
0.2 INJECTION, SOLUTION INTRAMUSCULAR; INTRAVENOUS; SUBCUTANEOUS EVERY 5 MIN PRN
Status: DISCONTINUED | OUTPATIENT
Start: 2022-06-29 | End: 2022-06-29

## 2022-06-29 RX ORDER — ONDANSETRON 2 MG/ML
INJECTION INTRAMUSCULAR; INTRAVENOUS AS NEEDED
Status: DISCONTINUED | OUTPATIENT
Start: 2022-06-29 | End: 2022-06-29 | Stop reason: SURG

## 2022-06-29 RX ORDER — ACETAMINOPHEN 500 MG
1000 TABLET ORAL ONCE AS NEEDED
Status: DISCONTINUED | OUTPATIENT
Start: 2022-06-29 | End: 2022-06-29

## 2022-06-29 RX ORDER — MIDAZOLAM HYDROCHLORIDE 1 MG/ML
1 INJECTION INTRAMUSCULAR; INTRAVENOUS EVERY 5 MIN PRN
Status: DISCONTINUED | OUTPATIENT
Start: 2022-06-29 | End: 2022-06-29

## 2022-06-29 RX ORDER — SODIUM CHLORIDE, SODIUM LACTATE, POTASSIUM CHLORIDE, CALCIUM CHLORIDE 600; 310; 30; 20 MG/100ML; MG/100ML; MG/100ML; MG/100ML
INJECTION, SOLUTION INTRAVENOUS CONTINUOUS
Status: DISCONTINUED | OUTPATIENT
Start: 2022-06-29 | End: 2022-06-29

## 2022-06-29 RX ORDER — TRAMADOL HYDROCHLORIDE 50 MG/1
50 TABLET ORAL ONCE AS NEEDED
Status: DISCONTINUED | OUTPATIENT
Start: 2022-06-29 | End: 2022-06-29

## 2022-06-29 RX ORDER — ONDANSETRON 2 MG/ML
INJECTION INTRAMUSCULAR; INTRAVENOUS
Status: COMPLETED
Start: 2022-06-29 | End: 2022-06-29

## 2022-06-29 RX ORDER — SCOLOPAMINE TRANSDERMAL SYSTEM 1 MG/1
1 PATCH, EXTENDED RELEASE TRANSDERMAL ONCE
Status: DISCONTINUED | OUTPATIENT
Start: 2022-06-29 | End: 2022-06-29

## 2022-06-29 RX ORDER — PROCHLORPERAZINE EDISYLATE 5 MG/ML
5 INJECTION INTRAMUSCULAR; INTRAVENOUS EVERY 8 HOURS PRN
Status: DISCONTINUED | OUTPATIENT
Start: 2022-06-29 | End: 2022-06-29

## 2022-06-29 RX ORDER — HYDROCODONE BITARTRATE AND ACETAMINOPHEN 5; 325 MG/1; MG/1
1 TABLET ORAL EVERY 6 HOURS PRN
Qty: 15 TABLET | Refills: 0 | Status: SHIPPED | OUTPATIENT
Start: 2022-06-29

## 2022-06-29 RX ORDER — HYDROMORPHONE HYDROCHLORIDE 1 MG/ML
0.6 INJECTION, SOLUTION INTRAMUSCULAR; INTRAVENOUS; SUBCUTANEOUS EVERY 5 MIN PRN
Status: DISCONTINUED | OUTPATIENT
Start: 2022-06-29 | End: 2022-06-29

## 2022-06-29 RX ORDER — GLYCOPYRROLATE 0.2 MG/ML
INJECTION, SOLUTION INTRAMUSCULAR; INTRAVENOUS AS NEEDED
Status: DISCONTINUED | OUTPATIENT
Start: 2022-06-29 | End: 2022-06-29 | Stop reason: SURG

## 2022-06-29 RX ORDER — CLINDAMYCIN PHOSPHATE 900 MG/50ML
INJECTION INTRAVENOUS AS NEEDED
Status: DISCONTINUED | OUTPATIENT
Start: 2022-06-29 | End: 2022-06-29 | Stop reason: SURG

## 2022-06-29 RX ORDER — ONDANSETRON 2 MG/ML
4 INJECTION INTRAMUSCULAR; INTRAVENOUS ONCE
Status: COMPLETED | OUTPATIENT
Start: 2022-06-29 | End: 2022-06-29

## 2022-06-29 RX ORDER — HYDROCODONE BITARTRATE AND ACETAMINOPHEN 5; 325 MG/1; MG/1
2 TABLET ORAL ONCE AS NEEDED
Status: DISCONTINUED | OUTPATIENT
Start: 2022-06-29 | End: 2022-06-29

## 2022-06-29 RX ORDER — MEPERIDINE HYDROCHLORIDE 25 MG/ML
12.5 INJECTION INTRAMUSCULAR; INTRAVENOUS; SUBCUTANEOUS AS NEEDED
Status: DISCONTINUED | OUTPATIENT
Start: 2022-06-29 | End: 2022-06-29

## 2022-06-29 RX ORDER — KETAMINE HYDROCHLORIDE 50 MG/ML
INJECTION, SOLUTION, CONCENTRATE INTRAMUSCULAR; INTRAVENOUS AS NEEDED
Status: DISCONTINUED | OUTPATIENT
Start: 2022-06-29 | End: 2022-06-29 | Stop reason: SURG

## 2022-06-29 RX ORDER — HYDROCODONE BITARTRATE AND ACETAMINOPHEN 5; 325 MG/1; MG/1
1 TABLET ORAL ONCE AS NEEDED
Status: DISCONTINUED | OUTPATIENT
Start: 2022-06-29 | End: 2022-06-29

## 2022-06-29 RX ORDER — LABETALOL HYDROCHLORIDE 5 MG/ML
5 INJECTION, SOLUTION INTRAVENOUS EVERY 5 MIN PRN
Status: DISCONTINUED | OUTPATIENT
Start: 2022-06-29 | End: 2022-06-29

## 2022-06-29 RX ORDER — BUPIVACAINE HYDROCHLORIDE 2.5 MG/ML
INJECTION, SOLUTION EPIDURAL; INFILTRATION; INTRACAUDAL AS NEEDED
Status: DISCONTINUED | OUTPATIENT
Start: 2022-06-29 | End: 2022-06-29 | Stop reason: HOSPADM

## 2022-06-29 RX ORDER — MIDAZOLAM HYDROCHLORIDE 1 MG/ML
INJECTION INTRAMUSCULAR; INTRAVENOUS AS NEEDED
Status: DISCONTINUED | OUTPATIENT
Start: 2022-06-29 | End: 2022-06-29 | Stop reason: SURG

## 2022-06-29 RX ORDER — DEXAMETHASONE SODIUM PHOSPHATE 4 MG/ML
VIAL (ML) INJECTION AS NEEDED
Status: DISCONTINUED | OUTPATIENT
Start: 2022-06-29 | End: 2022-06-29 | Stop reason: SURG

## 2022-06-29 RX ADMIN — MIDAZOLAM HYDROCHLORIDE 2 MG: 1 INJECTION INTRAMUSCULAR; INTRAVENOUS at 13:27:00

## 2022-06-29 RX ADMIN — SODIUM CHLORIDE, SODIUM LACTATE, POTASSIUM CHLORIDE, CALCIUM CHLORIDE: 600; 310; 30; 20 INJECTION, SOLUTION INTRAVENOUS at 14:34:00

## 2022-06-29 RX ADMIN — CLINDAMYCIN PHOSPHATE 900 MG: 900 INJECTION INTRAVENOUS at 13:29:00

## 2022-06-29 RX ADMIN — KETOROLAC TROMETHAMINE 30 MG: 30 INJECTION, SOLUTION INTRAMUSCULAR; INTRAVENOUS at 14:11:00

## 2022-06-29 RX ADMIN — SODIUM CHLORIDE, SODIUM LACTATE, POTASSIUM CHLORIDE, CALCIUM CHLORIDE: 600; 310; 30; 20 INJECTION, SOLUTION INTRAVENOUS at 13:27:00

## 2022-06-29 RX ADMIN — KETAMINE HYDROCHLORIDE 25 MG: 50 INJECTION, SOLUTION, CONCENTRATE INTRAMUSCULAR; INTRAVENOUS at 13:39:00

## 2022-06-29 RX ADMIN — ROCURONIUM BROMIDE 40 MG: 10 INJECTION, SOLUTION INTRAVENOUS at 13:31:00

## 2022-06-29 RX ADMIN — ROCURONIUM BROMIDE 10 MG: 10 INJECTION, SOLUTION INTRAVENOUS at 13:45:00

## 2022-06-29 RX ADMIN — GLYCOPYRROLATE 0.4 MG: 0.2 INJECTION, SOLUTION INTRAMUSCULAR; INTRAVENOUS at 14:16:00

## 2022-06-29 RX ADMIN — DEXAMETHASONE SODIUM PHOSPHATE 8 MG: 4 MG/ML VIAL (ML) INJECTION at 13:39:00

## 2022-06-29 RX ADMIN — GLYCOPYRROLATE 0.2 MG: 0.2 INJECTION, SOLUTION INTRAMUSCULAR; INTRAVENOUS at 13:39:00

## 2022-06-29 RX ADMIN — LIDOCAINE HYDROCHLORIDE 50 MG: 10 INJECTION, SOLUTION EPIDURAL; INFILTRATION; INTRACAUDAL; PERINEURAL at 13:31:00

## 2022-06-29 RX ADMIN — NEOSTIGMINE METHYLSULFATE 3 MG: 1 INJECTION, SOLUTION INTRAVENOUS at 14:16:00

## 2022-06-29 RX ADMIN — SODIUM CHLORIDE, SODIUM LACTATE, POTASSIUM CHLORIDE, CALCIUM CHLORIDE: 600; 310; 30; 20 INJECTION, SOLUTION INTRAVENOUS at 14:15:00

## 2022-06-29 RX ADMIN — ONDANSETRON 4 MG: 2 INJECTION INTRAMUSCULAR; INTRAVENOUS at 14:11:00

## 2022-06-29 NOTE — OPERATIVE REPORT
[address] [facility] Patient Status:  Hospital Outpatient Surgery    6/10/1975 MRN [id number]   Location [address] [place] [address] Attending Letty Arreaga, [address] [facility] Day # 0 PCP Jade Arguello MD     Date of procedure: 22    Preoperative diagnosis:  1. Hormone receptor positive breast cancer    Postoperative diagnosis:  The same    Procedure:  1. Laparoscopy  2. Bilateral salpingo-oophorectomy    Surgeon: Dr. Steven Roberts  Assistant: Dr Derrick Ness    Anesthesia: GETA  EBL: 10 mL  DVT ppx: SCDs    Findings:  1. Normal bilateral tubes and ovaries  2. Normal uterus    Complications: none    Specimens:  1. Bilateral tubes and ovaries    Indication for procedure: This is a 49year old female presenting for scheduled surgery. The risks of the procedure were reviewed, and she gave informed consent. Procedure: The patient was taken to the operating room and placed under general anesthesia. She was prepped and draped in sterile fashion in lithotomy position. A iraheta was inserted. The bivalved speculum was placed into the vagina, and the cervix was grasped with a single-tooth tenaculum. [place] uterine manipulator was placed. Attention was then turned to the abdomen. Local was injected prior to making a 59BT infraumbilical incision. A veress needle was used to insufflate the abdomen with carbon dioxide to a pressure of 15mm Hg. The 10mm camera and trochar were inserted. Findings were as noted. She was placed in trendelenburg. Two 5mm incisions were made in the bilateral lower quadrants. 5mm trochars were inserted under direct visualization. The uterus was anteverted. The right tube/ovary was grasped with a laparoscopic grasper and the IP ligament was cauterized with the Enseal.  This was continued to the cornu where the fallopian tube was transected. Hemostasis was assured. This procedure was repeated on the contralateral side.  The 5mm camera was then inserted into a side port and an endopouch was used to remove both ovaries through the umbilical incision. The Bjorn-Sanders was used to closed the fascia of the umbilical incision with a single stitch of 0 vicryl. The fascia still had a small hole so an 0-vicryl on a UR6 was used to close this space under direct visualization. The 5mm trocars were removed under direct visualization. The gas was released from the abdomen. The skin incisions were then closed with 4-0 monocryl. Dressing was mastisol and steri-strips. All instruments were then removed from the vagina, tenaculum site noted to be hemostatic. The iraheta was removed. She was awoken from general anesthesia and taken to the recovery room in good condition. She tolerated the procedure well.

## 2022-06-29 NOTE — ANESTHESIA POSTPROCEDURE EVALUATION
[address] [facility] Patient Status:  Hospital Outpatient Surgery   Age/Gender 49year old female MRN [id number]   Location [address] [facility] Attending Esteban Mckeon, [address] [facility] Day # 0 PCP Jade Romano MD       Anesthesia Post-op Note    LAPAROSCOPIC BILATERAL SALPINGO-OOPHORECTOMY    Procedure Summary     Date: 06/29/22 Room / Location: [address] [place] [address] OR 15 / [facility] OR    Anesthesia Start: 5847 Anesthesia Stop: 7877    Procedure: LAPAROSCOPIC BILATERAL SALPINGO-OOPHORECTOMY (Bilateral Abdomen) Diagnosis: (HORMONE RECEPTOR POSITIVE, HISTORY BREAST CANCER)    Surgeons: Esteban Mckeon DO Anesthesiologist: Kvng Shaffer MD    Anesthesia Type: general ASA Status: 2          Anesthesia Type: general    Vitals Value Taken Time   /83 06/29/22 1434   Temp 98 06/29/22 1434   Pulse 93 06/29/22 1434   Resp 20 06/29/22 1434   SpO2 97 06/29/22 1434       Patient Location: PACU    Anesthesia Type: general    Airway Patency: patent and extubated    Postop Pain Control: adequate    Mental Status: preanesthetic baseline    Nausea/Vomiting: none    Cardiopulmonary/Hydration status: stable euvolemic    Complications: no apparent anesthesia related complications    Postop vital signs: stable    Dental Exam: Unchanged from Preop    Patient to be discharged from PACU when criteria met.

## 2022-06-29 NOTE — ANESTHESIA PROCEDURE NOTES
Airway  Date/Time: 6/29/2022 1:33 PM  Urgency: elective    Airway not difficult    General Information and Staff    Patient location during procedure: OR  Anesthesiologist: Terrell David MD  Resident/CRNA: Geovanni Hermosillo CRNA  Performed: CRNA     Indications and Patient Condition  Indications for airway management: anesthesia  Spontaneous Ventilation: absent  Sedation level: deep  Preoxygenated: yes  Patient position: sniffing  Mask difficulty assessment: 1 - vent by mask    Final Airway Details  Final airway type: endotracheal airway      Successful airway: ETT  Cuffed: yes   Successful intubation technique: direct laryngoscopy  Facilitating devices/methods: intubating stylet  Endotracheal tube insertion site: oral  Blade: Briseida  Blade size: #3  ETT size (mm): 7.0    Cormack-Lehane Classification: grade I - full view of glottis  Placement verified by: chest auscultation and capnometry   Measured from: lips  ETT to lips (cm): 22  Number of attempts at approach: 1  Number of other approaches attempted: 0    Additional Comments  Dentition per pre op

## 2022-07-11 ENCOUNTER — APPOINTMENT (OUTPATIENT)
Dept: HEMATOLOGY/ONCOLOGY | Facility: HOSPITAL | Age: 47
End: 2022-07-11
Attending: SPECIALIST
Payer: COMMERCIAL

## 2022-07-22 ENCOUNTER — TELEPHONE (OUTPATIENT)
Dept: HEMATOLOGY/ONCOLOGY | Facility: HOSPITAL | Age: 47
End: 2022-07-22

## 2022-08-08 ENCOUNTER — APPOINTMENT (OUTPATIENT)
Dept: HEMATOLOGY/ONCOLOGY | Facility: HOSPITAL | Age: 47
End: 2022-08-08
Attending: SPECIALIST
Payer: COMMERCIAL

## 2022-08-22 ENCOUNTER — OFFICE VISIT (OUTPATIENT)
Dept: HEMATOLOGY/ONCOLOGY | Facility: HOSPITAL | Age: 47
End: 2022-08-22
Attending: SPECIALIST
Payer: COMMERCIAL

## 2022-08-22 VITALS
RESPIRATION RATE: 18 BRPM | SYSTOLIC BLOOD PRESSURE: 167 MMHG | HEART RATE: 60 BPM | TEMPERATURE: 98 F | DIASTOLIC BLOOD PRESSURE: 81 MMHG | BODY MASS INDEX: 21.18 KG/M2 | WEIGHT: 136.5 LBS | OXYGEN SATURATION: 99 % | HEIGHT: 67.44 IN

## 2022-08-22 DIAGNOSIS — Z17.0 MALIGNANT NEOPLASM OF UPPER-OUTER QUADRANT OF RIGHT BREAST IN FEMALE, ESTROGEN RECEPTOR POSITIVE (HCC): ICD-10-CM

## 2022-08-22 DIAGNOSIS — M25.50 AROMATASE INHIBITOR-ASSOCIATED ARTHRALGIA: Primary | ICD-10-CM

## 2022-08-22 DIAGNOSIS — T45.1X5A AROMATASE INHIBITOR-ASSOCIATED ARTHRALGIA: Primary | ICD-10-CM

## 2022-08-22 DIAGNOSIS — C50.411 MALIGNANT NEOPLASM OF UPPER-OUTER QUADRANT OF RIGHT BREAST IN FEMALE, ESTROGEN RECEPTOR POSITIVE (HCC): ICD-10-CM

## 2022-08-22 PROCEDURE — 99214 OFFICE O/P EST MOD 30 MIN: CPT | Performed by: SPECIALIST

## 2022-08-22 RX ORDER — EXEMESTANE 25 MG/1
25 TABLET ORAL DAILY
Qty: 30 TABLET | Refills: 0 | Status: SHIPPED | OUTPATIENT
Start: 2022-08-22

## 2022-08-22 NOTE — PROGRESS NOTES
Patient is here today for follow up with Dr. Hillary Finley  for Malignant neoplasm Right Breast. Patient is on anastrozole therapy. Patient stated pain rated a 5 on a scale of 0-10 Hand, shoulders, elbows, knees an ankles. Medication list,medical history and toxicities were reviewed and updated. Education Record    Learner:  Patient      Disease / Diagnosis: Malignant neoplasm right breast     Barriers / Limitations:  None   Comments:    Method:  Brief focused, Discussion, Printed material and Reinforcement   Comments:    General Topics:  Medication, Pain, Procedure and Plan of care reviewed   Comments:    Outcome:  Shows understanding   Comments:    Per Dr. Hillary Finley post MD visit. Patient patient changed to Exemestane. [facility] notes on Tamoxifen give. AVS provided and follow up reviewed. Patient instructed to call as needed.

## 2022-09-01 ENCOUNTER — TELEPHONE (OUTPATIENT)
Dept: HEMATOLOGY/ONCOLOGY | Facility: HOSPITAL | Age: 47
End: 2022-09-01

## 2022-09-08 RX ORDER — EXEMESTANE 25 MG/1
25 TABLET ORAL DAILY
Qty: 15 TABLET | Refills: 1 | OUTPATIENT
Start: 2022-09-08

## 2022-09-16 ENCOUNTER — OFFICE VISIT (OUTPATIENT)
Facility: LOCATION | Age: 47
End: 2022-09-16
Payer: COMMERCIAL

## 2022-09-16 DIAGNOSIS — H93.13 TINNITUS OF BOTH EARS: ICD-10-CM

## 2022-09-16 DIAGNOSIS — H93.293 ABNORMAL AUDITORY PERCEPTION OF BOTH EARS: Primary | ICD-10-CM

## 2022-09-16 DIAGNOSIS — H92.03 OTALGIA, BILATERAL: ICD-10-CM

## 2022-09-16 DIAGNOSIS — H93.13 TINNITUS OF BOTH EARS: Primary | ICD-10-CM

## 2022-09-16 DIAGNOSIS — H69.83 DYSFUNCTION OF BOTH EUSTACHIAN TUBES: ICD-10-CM

## 2022-09-16 PROCEDURE — 92567 TYMPANOMETRY: CPT | Performed by: AUDIOLOGIST-HEARING AID FITTER

## 2022-09-16 PROCEDURE — 92571 FILTERED SPEECH TEST: CPT | Performed by: AUDIOLOGIST-HEARING AID FITTER

## 2022-09-16 PROCEDURE — 92557 COMPREHENSIVE HEARING TEST: CPT | Performed by: AUDIOLOGIST-HEARING AID FITTER

## 2022-09-16 PROCEDURE — 99203 OFFICE O/P NEW LOW 30 MIN: CPT | Performed by: OTOLARYNGOLOGY

## 2022-09-16 RX ORDER — PREDNISONE 1 MG/1
5 TABLET ORAL DAILY
Qty: 7 TABLET | Refills: 0 | Status: SHIPPED | OUTPATIENT
Start: 2022-09-16

## 2022-09-16 RX ORDER — FLUTICASONE PROPIONATE 50 MCG
2 SPRAY, SUSPENSION (ML) NASAL DAILY
Qty: 16 G | Refills: 3 | Status: SHIPPED | OUTPATIENT
Start: 2022-09-16

## 2022-09-16 NOTE — PROGRESS NOTES
Nohemi Wilson was seen for an audiometric evaluation today. Referred back to physician.     Sorin Rollins

## 2022-09-21 RX ORDER — TAMOXIFEN CITRATE 20 MG/1
20 TABLET ORAL DAILY
Qty: 90 TABLET | Refills: 1 | Status: SHIPPED | OUTPATIENT
Start: 2022-09-21

## 2022-10-13 ENCOUNTER — HOSPITAL ENCOUNTER (OUTPATIENT)
Dept: MAMMOGRAPHY | Facility: HOSPITAL | Age: 47
Discharge: HOME | End: 2022-10-13
Attending: SURGERY
Payer: COMMERCIAL

## 2022-10-13 DIAGNOSIS — Z17.0 MALIGNANT NEOPLASM OF UPPER-OUTER QUADRANT OF RIGHT BREAST IN FEMALE, ESTROGEN RECEPTOR POSITIVE (HCC): ICD-10-CM

## 2022-10-13 DIAGNOSIS — C50.411 MALIGNANT NEOPLASM OF UPPER-OUTER QUADRANT OF RIGHT BREAST IN FEMALE, ESTROGEN RECEPTOR POSITIVE (HCC): ICD-10-CM

## 2022-11-15 ENCOUNTER — HOSPITAL ENCOUNTER (OUTPATIENT)
Dept: MAMMOGRAPHY | Facility: HOSPITAL | Age: 47
Discharge: HOME | End: 2022-11-15
Attending: SURGERY
Payer: COMMERCIAL

## 2022-11-15 PROCEDURE — 77062 BREAST TOMOSYNTHESIS BI: CPT | Performed by: SURGERY

## 2022-11-15 PROCEDURE — 77066 DX MAMMO INCL CAD BI: CPT | Performed by: SURGERY

## 2022-11-18 ENCOUNTER — OFFICE VISIT (OUTPATIENT)
Facility: LOCATION | Age: 47
End: 2022-11-18
Payer: COMMERCIAL

## 2022-11-18 DIAGNOSIS — H93.A2 PULSATILE TINNITUS OF LEFT EAR: ICD-10-CM

## 2022-11-18 DIAGNOSIS — H69.83 DYSFUNCTION OF BOTH EUSTACHIAN TUBES: ICD-10-CM

## 2022-11-18 DIAGNOSIS — G44.85 PRIMARY STABBING HEADACHE: ICD-10-CM

## 2022-11-18 DIAGNOSIS — H93.13 TINNITUS OF BOTH EARS: Primary | ICD-10-CM

## 2022-11-18 PROCEDURE — 99213 OFFICE O/P EST LOW 20 MIN: CPT | Performed by: OTOLARYNGOLOGY

## 2022-11-18 RX ORDER — PREDNISONE 1 MG/1
5 TABLET ORAL DAILY
Qty: 7 TABLET | Refills: 0 | Status: SHIPPED | OUTPATIENT
Start: 2022-11-18

## 2022-11-18 NOTE — PROGRESS NOTES
El Evans is a 49year old female. Patient presents with:  Ear Problem    HPI:   She has had persistent pulsatile tinnitus in the left ear. It is becoming worse and constant. She also gets fullness and pressure in ears. Since Monday she has had left facial and eye pain. She saw the eye doctor and exam was not that impressive. She is tried Tylenol Sinus with limited relief    REVIEW OF SYSTEMS:   GENERAL HEALTH: feels well otherwise  GENERAL : denies fever, chills, sweats, weight loss, weight gain  SKIN: denies any unusual skin lesions or rashes  RESPIRATORY: denies shortness of breath with exertion  NEURO: denies headaches    EXAM:   There were no vitals taken for this visit. System Findings Details   Constitutional  Overall appearance - Normal.   Psychiatric  Orientation - Oriented to time, place, person & situation. Appropriate mood and affect. Head/Face  Facial features -- Normal. Skull - Normal.   Eyes  Pupils equal ,round ,react to light and accomidate   Ears, Nose, Throat, Neck   ears clear no fluid nose erythema and congestion oral cavity oropharynx clear neck no masses   Neurological  Memory - Normal. Cranial nerves - Cranial nerves II through XII grossly intact. Lymph Detail  Submental. Submandibular. Anterior cervical. Posterior cervical. Supraclavicular. ASSESSMENT AND PLAN:   1. Tinnitus of both ears  Improved but the left ear symptoms persist    2. Dysfunction of both eustachian tubes  Persistent    3. Primary stabbing headache  She has had sinus pain and pressure along with headache and persistent tinnitus now on the on the left-hand side. The tinnitus is pulsatile. It is worsening. To undergo MRI scan  - MRI IACS (W+WO) (CPT=70553); Future    4. Pulsatile tinnitus of left ear  She will add low-dose steroid to her Tylenol Sinus.  - MRI IACS (W+WO) (CPT=70553); Future      The patient indicates understanding of these issues and agrees to the plan.     No follow-ups on file.    Sam Ordonez MD  11/18/2022  12:00 PM

## 2022-11-21 ENCOUNTER — OFFICE VISIT (OUTPATIENT)
Dept: HEMATOLOGY/ONCOLOGY | Facility: HOSPITAL | Age: 47
End: 2022-11-21
Attending: SPECIALIST
Payer: COMMERCIAL

## 2022-11-21 VITALS
SYSTOLIC BLOOD PRESSURE: 151 MMHG | BODY MASS INDEX: 22 KG/M2 | RESPIRATION RATE: 18 BRPM | OXYGEN SATURATION: 100 % | WEIGHT: 145.5 LBS | HEART RATE: 56 BPM | TEMPERATURE: 97 F | DIASTOLIC BLOOD PRESSURE: 87 MMHG

## 2022-11-21 DIAGNOSIS — Z79.810 LONG-TERM CURRENT USE OF TAMOXIFEN: ICD-10-CM

## 2022-11-21 DIAGNOSIS — C50.411 MALIGNANT NEOPLASM OF UPPER-OUTER QUADRANT OF RIGHT BREAST IN FEMALE, ESTROGEN RECEPTOR POSITIVE (HCC): Primary | ICD-10-CM

## 2022-11-21 DIAGNOSIS — Z17.0 MALIGNANT NEOPLASM OF UPPER-OUTER QUADRANT OF RIGHT BREAST IN FEMALE, ESTROGEN RECEPTOR POSITIVE (HCC): Primary | ICD-10-CM

## 2022-11-21 PROCEDURE — 99214 OFFICE O/P EST MOD 30 MIN: CPT | Performed by: SPECIALIST

## 2022-11-21 NOTE — PROGRESS NOTES
Patient is here today for 3 month  follow up with Dr. Gavin Pennington  for Malignant Neoplasm right breast. Patient is on Tamoxifen therapy. Patient denies pain. Stated hot flashes which are tolerable. Medication list,medical history and toxicities were reviewed and updated. Education Record    Learner:  Patient      Disease / Diagnosis: Malignant neoplasm right breast    Barriers / Limitations:  None   Comments:    Method:  Brief focused, Discussion, Printed material and Reinforcement   Comments:    General Topics:  Medication, Pain, Procedure and Plan of care reviewed   Comments:    Outcome:  Shows understanding   Comments:    AVS provided and follow up reviewed. Patient instructed to call as needed.

## 2022-12-01 ENCOUNTER — HOSPITAL ENCOUNTER (OUTPATIENT)
Dept: MRI IMAGING | Age: 47
Discharge: HOME | End: 2022-12-01
Attending: OTOLARYNGOLOGY
Payer: COMMERCIAL

## 2022-12-01 DIAGNOSIS — H93.A2 PULSATILE TINNITUS OF LEFT EAR: ICD-10-CM

## 2022-12-01 DIAGNOSIS — G44.85 PRIMARY STABBING HEADACHE: ICD-10-CM

## 2022-12-01 PROCEDURE — 70553 MRI BRAIN STEM W/O & W/DYE: CPT | Performed by: OTOLARYNGOLOGY

## 2022-12-01 PROCEDURE — A9575 INJ GADOTERATE MEGLUMI 0.1ML: HCPCS | Performed by: OTOLARYNGOLOGY

## 2022-12-01 RX ORDER — GADOTERATE MEGLUMINE 376.9 MG/ML
13 INJECTION INTRAVENOUS
Status: COMPLETED | OUTPATIENT
Start: 2022-12-01 | End: 2022-12-01

## 2022-12-01 RX ADMIN — GADOTERATE MEGLUMINE 13 ML: 376.9 INJECTION INTRAVENOUS at 17:38:00

## 2022-12-02 ENCOUNTER — TELEPHONE (OUTPATIENT)
Facility: LOCATION | Age: 47
End: 2022-12-02

## 2022-12-02 NOTE — TELEPHONE ENCOUNTER
I spoke with patient. MRI scan is normal.  She will follow-up with Dr. Harvinder Caicedo with neurosurgery.

## 2022-12-15 RX ORDER — FLUTICASONE PROPIONATE 50 MCG
SPRAY, SUSPENSION (ML) NASAL
Qty: 48 ML | Refills: 1 | Status: SHIPPED | OUTPATIENT
Start: 2022-12-15

## 2023-01-15 ENCOUNTER — HOSPITAL ENCOUNTER (OUTPATIENT)
Age: 48
Discharge: HOME | End: 2023-01-15
Payer: COMMERCIAL

## 2023-01-15 VITALS
HEIGHT: 68 IN | WEIGHT: 140 LBS | DIASTOLIC BLOOD PRESSURE: 93 MMHG | SYSTOLIC BLOOD PRESSURE: 133 MMHG | RESPIRATION RATE: 18 BRPM | BODY MASS INDEX: 21.22 KG/M2 | HEART RATE: 68 BPM | TEMPERATURE: 98 F | OXYGEN SATURATION: 99 %

## 2023-01-15 DIAGNOSIS — H69.81 EUSTACHIAN TUBE DYSFUNCTION, RIGHT: Primary | ICD-10-CM

## 2023-01-15 PROCEDURE — 99213 OFFICE O/P EST LOW 20 MIN: CPT | Performed by: PHYSICIAN ASSISTANT

## 2023-02-03 ENCOUNTER — OFFICE VISIT (OUTPATIENT)
Dept: SURGERY | Facility: CLINIC | Age: 48
End: 2023-02-03
Payer: COMMERCIAL

## 2023-02-03 VITALS
HEART RATE: 68 BPM | BODY MASS INDEX: 21.22 KG/M2 | OXYGEN SATURATION: 99 % | SYSTOLIC BLOOD PRESSURE: 130 MMHG | DIASTOLIC BLOOD PRESSURE: 74 MMHG | WEIGHT: 140 LBS | HEIGHT: 68 IN

## 2023-02-03 DIAGNOSIS — H93.A2 PULSATILE TINNITUS OF LEFT EAR: Primary | ICD-10-CM

## 2023-02-03 PROCEDURE — 3008F BODY MASS INDEX DOCD: CPT | Performed by: NEUROLOGICAL SURGERY

## 2023-02-03 PROCEDURE — 99203 OFFICE O/P NEW LOW 30 MIN: CPT | Performed by: NEUROLOGICAL SURGERY

## 2023-02-03 PROCEDURE — 3075F SYST BP GE 130 - 139MM HG: CPT | Performed by: NEUROLOGICAL SURGERY

## 2023-02-03 PROCEDURE — 3078F DIAST BP <80 MM HG: CPT | Performed by: NEUROLOGICAL SURGERY

## 2023-03-13 ENCOUNTER — HOSPITAL ENCOUNTER (OUTPATIENT)
Dept: MRI IMAGING | Age: 48
Discharge: HOME | End: 2023-03-13
Attending: NEUROLOGICAL SURGERY
Payer: COMMERCIAL

## 2023-03-13 ENCOUNTER — TELEPHONE (OUTPATIENT)
Dept: SURGERY | Facility: CLINIC | Age: 48
End: 2023-03-13

## 2023-03-13 DIAGNOSIS — H93.A2 PULSATILE TINNITUS OF LEFT EAR: ICD-10-CM

## 2023-03-13 PROCEDURE — A9575 INJ GADOTERATE MEGLUMI 0.1ML: HCPCS | Performed by: NEUROLOGICAL SURGERY

## 2023-03-13 PROCEDURE — 70546 MR ANGIOGRAPH HEAD W/O&W/DYE: CPT | Performed by: NEUROLOGICAL SURGERY

## 2023-03-13 RX ORDER — GADOTERATE MEGLUMINE 376.9 MG/ML
14 INJECTION INTRAVENOUS
Status: COMPLETED | OUTPATIENT
Start: 2023-03-13 | End: 2023-03-13

## 2023-03-13 RX ADMIN — GADOTERATE MEGLUMINE 14 ML: 376.9 INJECTION INTRAVENOUS at 12:07:00

## 2023-03-24 RX ORDER — TAMOXIFEN CITRATE 20 MG/1
TABLET ORAL
Qty: 90 TABLET | Refills: 0 | Status: SHIPPED | OUTPATIENT
Start: 2023-03-24

## 2023-04-12 ENCOUNTER — HOSPITAL ENCOUNTER (OUTPATIENT)
Dept: MRI IMAGING | Facility: HOSPITAL | Age: 48
Discharge: HOME | End: 2023-04-12
Attending: NEUROLOGICAL SURGERY
Payer: COMMERCIAL

## 2023-04-12 DIAGNOSIS — H93.A2 PULSATILE TINNITUS OF LEFT EAR: ICD-10-CM

## 2023-04-12 PROCEDURE — 70549 MR ANGIOGRAPH NECK W/O&W/DYE: CPT | Performed by: NEUROLOGICAL SURGERY

## 2023-04-12 PROCEDURE — A9575 INJ GADOTERATE MEGLUMI 0.1ML: HCPCS | Performed by: NEUROLOGICAL SURGERY

## 2023-04-12 RX ORDER — GADOTERATE MEGLUMINE 376.9 MG/ML
15 INJECTION INTRAVENOUS
Status: COMPLETED | OUTPATIENT
Start: 2023-04-12 | End: 2023-04-12

## 2023-04-12 RX ADMIN — GADOTERATE MEGLUMINE 12 ML: 376.9 INJECTION INTRAVENOUS at 10:36:00

## 2023-04-24 ENCOUNTER — TELEPHONE (OUTPATIENT)
Dept: SURGERY | Facility: CLINIC | Age: 48
End: 2023-04-24

## 2023-04-24 NOTE — TELEPHONE ENCOUNTER
In routing message from Dr. Fuad Upton today:    \"Please tell the patient that imaging is unconcerning and no further workup is necessary. She is welcome to return to clinic if her symptoms worsen such that she would want treatment. Thank you! \"    Patient completed MRA carotids on 4/12/23. Messaged patient and informed her of above.

## 2023-05-15 ENCOUNTER — OFFICE VISIT (OUTPATIENT)
Dept: HEMATOLOGY/ONCOLOGY | Facility: HOSPITAL | Age: 48
End: 2023-05-15
Attending: SPECIALIST
Payer: COMMERCIAL

## 2023-05-15 VITALS
BODY MASS INDEX: 22.26 KG/M2 | WEIGHT: 143.5 LBS | HEIGHT: 67.44 IN | SYSTOLIC BLOOD PRESSURE: 137 MMHG | HEART RATE: 53 BPM | TEMPERATURE: 98 F | OXYGEN SATURATION: 100 % | RESPIRATION RATE: 18 BRPM | DIASTOLIC BLOOD PRESSURE: 84 MMHG

## 2023-05-15 DIAGNOSIS — Z79.810 LONG-TERM CURRENT USE OF TAMOXIFEN: Primary | ICD-10-CM

## 2023-05-15 DIAGNOSIS — C50.411 MALIGNANT NEOPLASM OF UPPER-OUTER QUADRANT OF RIGHT BREAST IN FEMALE, ESTROGEN RECEPTOR POSITIVE (HCC): ICD-10-CM

## 2023-05-15 DIAGNOSIS — Z17.0 MALIGNANT NEOPLASM OF UPPER-OUTER QUADRANT OF RIGHT BREAST IN FEMALE, ESTROGEN RECEPTOR POSITIVE (HCC): ICD-10-CM

## 2023-05-15 PROCEDURE — 99214 OFFICE O/P EST MOD 30 MIN: CPT | Performed by: SPECIALIST

## 2023-05-15 NOTE — PROGRESS NOTES
Patient is here today for 6 month follow up with Sandhya John  for Malignant neoplasm right breast  Patient denies pain. Currently on Tamoxifen therapy. Stated intermittent hot flashes which are tolerable. Stated weight gain. Tries to control with intermittent fasting. Medication list,medical history and toxicities were reviewed and updated. Education Record    Learner:  Patient      Disease / Diagnosis: Malignant neoplasm right breast    Barriers / Limitations:  None   Comments:    Method:  Brief focused, Discussion, Printed material and Reinforcement   Comments:    General Topics:  Medication, Pain, Procedure and Plan of care reviewed   Comments:    Outcome:  Shows understanding   Comments:    AVS provided and follow up reviewed. Patient instructed to call as needed.

## 2023-05-22 ENCOUNTER — HOSPITAL ENCOUNTER (OUTPATIENT)
Dept: MAMMOGRAPHY | Facility: HOSPITAL | Age: 48
Discharge: HOME | End: 2023-05-22
Attending: SPECIALIST
Payer: COMMERCIAL

## 2023-05-22 DIAGNOSIS — Z17.0 MALIGNANT NEOPLASM OF UPPER-OUTER QUADRANT OF RIGHT BREAST IN FEMALE, ESTROGEN RECEPTOR POSITIVE (HCC): ICD-10-CM

## 2023-05-22 DIAGNOSIS — C50.411 MALIGNANT NEOPLASM OF UPPER-OUTER QUADRANT OF RIGHT BREAST IN FEMALE, ESTROGEN RECEPTOR POSITIVE (HCC): ICD-10-CM

## 2023-05-22 PROCEDURE — 77065 DX MAMMO INCL CAD UNI: CPT | Performed by: SPECIALIST

## 2023-05-22 PROCEDURE — 77061 BREAST TOMOSYNTHESIS UNI: CPT | Performed by: SPECIALIST

## 2023-06-01 ENCOUNTER — TELEPHONE (OUTPATIENT)
Dept: SURGERY | Facility: CLINIC | Age: 48
End: 2023-06-01

## 2023-06-01 NOTE — TELEPHONE ENCOUNTER
Pt calling stating her MRA was denied by insurance. Pt states insurance company sent a denial stating your doctor did not know if you had this test in the past. Pt states she discussed this with Dr. Heri Lau and told him she did not have an MRA test before. Pt states she will need a letter stating the doctor did know that pt had never had an MRA. Pt is requesting this letter be faxed to [phone] and uploaded to [employer].

## 2023-07-03 RX ORDER — TAMOXIFEN CITRATE 20 MG/1
TABLET ORAL
Qty: 90 TABLET | Refills: 1 | Status: SHIPPED | OUTPATIENT
Start: 2023-07-03

## 2023-08-22 NOTE — TELEPHONE ENCOUNTER
0935: Spoke with Antonio Vega at this time. Discussed sentinel lymph node mapping to be done in the nuclear medicine department  and wire localization to be done [facility] prior to surgery on Thursday, October 14.   Both procedu 23-Aug-2023

## 2023-11-20 ENCOUNTER — APPOINTMENT (OUTPATIENT)
Dept: HEMATOLOGY/ONCOLOGY | Facility: HOSPITAL | Age: 48
End: 2023-11-20
Attending: SPECIALIST
Payer: COMMERCIAL

## 2023-11-20 ENCOUNTER — TELEPHONE (OUTPATIENT)
Dept: HEMATOLOGY/ONCOLOGY | Facility: HOSPITAL | Age: 48
End: 2023-11-20

## 2023-11-20 NOTE — TELEPHONE ENCOUNTER
Toxicities: Tamoxifen    I attempted to reach Avril to do a telephone assessment. No answer. I left a voice mail message asking her to please return my call at her earliest convenience. I also let her know she should reschedule her 6 month follow up appointment when she is feeling better.

## 2023-11-20 NOTE — TELEPHONE ENCOUNTER
Patient reports she woke up with a cold. She has a runny nose and a dry cough.     I told her to please drink plenty of fluids, eat well, rest and take OTC cold medication if needed. If her symptoms worsen to contact her PCP. She agreed with the plan. She said she will call back to reschedule her follow up appointment when she is feeling better.

## 2023-11-20 NOTE — TELEPHONE ENCOUNTER
Per after hour message the patient is cancelling her appointment with Dr. Hawk for today 11/20/23 at 1100 AM. The message stated that she is not feeling well.   I attempted to call the patient to make a sick call and there was no answer. Dr. Hawk pt.

## 2023-12-19 PROBLEM — Z12.11 SPECIAL SCREENING FOR MALIGNANT NEOPLASM OF COLON: Status: ACTIVE | Noted: 2023-12-19

## 2023-12-20 PROBLEM — D12.0 BENIGN NEOPLASM OF CECUM: Status: ACTIVE | Noted: 2023-12-20

## 2024-05-02 ENCOUNTER — OFFICE VISIT (OUTPATIENT)
Dept: HEMATOLOGY/ONCOLOGY | Facility: HOSPITAL | Age: 49
End: 2024-05-02
Attending: SPECIALIST
Payer: COMMERCIAL

## 2024-05-02 VITALS
HEART RATE: 61 BPM | DIASTOLIC BLOOD PRESSURE: 73 MMHG | OXYGEN SATURATION: 97 % | BODY MASS INDEX: 23 KG/M2 | SYSTOLIC BLOOD PRESSURE: 143 MMHG | WEIGHT: 143.81 LBS | TEMPERATURE: 97 F

## 2024-05-02 DIAGNOSIS — Z17.0 MALIGNANT NEOPLASM OF UPPER-OUTER QUADRANT OF RIGHT BREAST IN FEMALE, ESTROGEN RECEPTOR POSITIVE (HCC): Primary | ICD-10-CM

## 2024-05-02 DIAGNOSIS — C50.411 MALIGNANT NEOPLASM OF UPPER-OUTER QUADRANT OF RIGHT BREAST IN FEMALE, ESTROGEN RECEPTOR POSITIVE (HCC): Primary | ICD-10-CM

## 2024-05-02 DIAGNOSIS — Z79.810 LONG-TERM CURRENT USE OF TAMOXIFEN: ICD-10-CM

## 2024-05-02 DIAGNOSIS — R25.2 MUSCLE CRAMPS: ICD-10-CM

## 2024-05-02 LAB
ALBUMIN SERPL-MCNC: 4.2 G/DL (ref 3.4–5)
ALBUMIN/GLOB SERPL: 1.5 {RATIO} (ref 1–2)
ALP LIVER SERPL-CCNC: 42 U/L
ALT SERPL-CCNC: 31 U/L
ANION GAP SERPL CALC-SCNC: 3 MMOL/L (ref 0–18)
AST SERPL-CCNC: 26 U/L (ref 15–37)
BASOPHILS # BLD AUTO: 0.03 X10(3) UL (ref 0–0.2)
BASOPHILS NFR BLD AUTO: 0.5 %
BILIRUB SERPL-MCNC: 0.5 MG/DL (ref 0.1–2)
BUN BLD-MCNC: 9 MG/DL (ref 9–23)
CALCIUM BLD-MCNC: 9.1 MG/DL (ref 8.5–10.1)
CHLORIDE SERPL-SCNC: 110 MMOL/L (ref 98–112)
CO2 SERPL-SCNC: 29 MMOL/L (ref 21–32)
CREAT BLD-MCNC: 0.71 MG/DL
EGFRCR SERPLBLD CKD-EPI 2021: 104 ML/MIN/1.73M2 (ref 60–?)
EOSINOPHIL # BLD AUTO: 0.01 X10(3) UL (ref 0–0.7)
EOSINOPHIL NFR BLD AUTO: 0.2 %
ERYTHROCYTE [DISTWIDTH] IN BLOOD BY AUTOMATED COUNT: 12.4 %
GLOBULIN PLAS-MCNC: 2.8 G/DL (ref 2.8–4.4)
GLUCOSE BLD-MCNC: 83 MG/DL (ref 70–99)
HCT VFR BLD AUTO: 41.1 %
HGB BLD-MCNC: 13.5 G/DL
IMM GRANULOCYTES # BLD AUTO: 0.02 X10(3) UL (ref 0–1)
IMM GRANULOCYTES NFR BLD: 0.4 %
LYMPHOCYTES # BLD AUTO: 1.62 X10(3) UL (ref 1–4)
LYMPHOCYTES NFR BLD AUTO: 29.6 %
MAGNESIUM SERPL-MCNC: 2.5 MG/DL (ref 1.6–2.6)
MCH RBC QN AUTO: 29.3 PG (ref 26–34)
MCHC RBC AUTO-ENTMCNC: 32.8 G/DL (ref 31–37)
MCV RBC AUTO: 89.3 FL
MONOCYTES # BLD AUTO: 0.41 X10(3) UL (ref 0.1–1)
MONOCYTES NFR BLD AUTO: 7.5 %
NEUTROPHILS # BLD AUTO: 3.38 X10 (3) UL (ref 1.5–7.7)
NEUTROPHILS # BLD AUTO: 3.38 X10(3) UL (ref 1.5–7.7)
NEUTROPHILS NFR BLD AUTO: 61.8 %
OSMOLALITY SERPL CALC.SUM OF ELEC: 292 MOSM/KG (ref 275–295)
PHOSPHATE SERPL-MCNC: 3 MG/DL (ref 2.5–4.9)
PLATELET # BLD AUTO: 242 10(3)UL (ref 150–450)
POTASSIUM SERPL-SCNC: 4.3 MMOL/L (ref 3.5–5.1)
PROT SERPL-MCNC: 7 G/DL (ref 6.4–8.2)
RBC # BLD AUTO: 4.6 X10(6)UL
SODIUM SERPL-SCNC: 142 MMOL/L (ref 136–145)
TSI SER-ACNC: 1.22 MIU/ML (ref 0.36–3.74)
VIT D+METAB SERPL-MCNC: 28.1 NG/ML (ref 30–100)
WBC # BLD AUTO: 5.5 X10(3) UL (ref 4–11)

## 2024-05-02 PROCEDURE — 99214 OFFICE O/P EST MOD 30 MIN: CPT | Performed by: SPECIALIST

## 2024-05-02 PROCEDURE — G2211 COMPLEX E/M VISIT ADD ON: HCPCS | Performed by: SPECIALIST

## 2024-05-02 RX ORDER — TAMOXIFEN CITRATE 20 MG/1
20 TABLET ORAL DAILY
Qty: 30 TABLET | Refills: 0 | Status: SHIPPED | OUTPATIENT
Start: 2024-05-02

## 2024-05-02 NOTE — PROGRESS NOTES
[facility] Progress Note      Patient Name: Avril Barth   YOB: 1975  Medical Record Number: [id number]  Attending Physician: Dany Hawk M.D.     The 21st Century Cures Act makes medical notes like these available to patients in the interest of transparency. Please be advised this is a medical document. Medical documents are intended to carry relevant information, facts as evident, and the clinical opinion of the practitioner. The medical note is intended as peer to peer communication and may appear blunt or direct. It is written in medical language and may contain abbreviations or verbiage that are unfamiliar.     Date of Visit: 5/2/2024     Chief Complaint  Invasive mammary carcinoma, right breast - follow up.    Oncologic History  Avril Barth is a 48 year old female who on 09/17/2021 underwent bilateral mammography and right breast ultrasound for a palpable right breast mass which showed a 1.6 cm mass at the 12 o'clock position in the right breast. Ultrasound guided biopsy performed on 09/20/2021 showed grade 2 invasive mammary carcinoma with focal signet right cells. Immunohistochemical studies were as follows: estrogen receptor 80% positive (moderate), progesterone receptor 40% (moderate-strong), Her2 2+, and Ki67 8%. Her2/santos by FISH was not amplified.     MRI of the breasts on 09/24/2021 showed a 1.1 x 1.5 x 1.9 cm mass in the upper central of the right breast at posterior depth abutting the pectoralis musculature and no suspicious adenopathy. No suspicious findings described in the left breast, though ultrasound of the left axilla has been recommended. Of note, patient had DTP vaccine in the left arm one week prior. Patient was staged as [id number].    Genetic testing revealed no known pathogenic variants were found in the following 48 genes: APC, ROHAN, AXIN2, BAP1, BARD1, BMPR1A, BRCA1, BRCA2, BRIP1, CDH1, CDK4, CDKN2A, CHEK2, CTNNA1, DICER1, GREM1  (promoter region deletion/duplication testing only), HOXB13 (sequencing only), KIT, MEN1, PMS2, MSH2 (including EPCAM rearrangement testing), MSH3, MSH6, MUTYH, NBN, NF1, NTHL1 (sequencing only), PALB2, PDGRFA, PMS2, POLD1, POLE, PTEN, RAD50, RAD51C, RAD51D, SDHA (sequencing only), SDHB, SDHC, SDHD, SMAD4, SMARCA4, STK11, TP53, TSC1, TSC2, and VHL.    On 10/14/2021, she underwent right breast lumpectomy with sentinel lymph node biopsy. Pathology showed 2 foci of grade 2 invasive ductal carcinoma with the larger measuring 1.4 cm; associated grade 2 and 3 ductal carcinoma-in-situ; 6 lymph nodes negative for metastasis (0/6); and negative surgical margins. Her2 by immunohistochemistry was repeated and was 1+ (negative).     OncotypeDX testing revealed a recurrence score of 25.     Patient was premenopausal at diagnosis.    On 11/15/2021 she began adjuvant chemotherapy with docetaxel and cyclophosphamide.     Beginning on 02/02/2022 she received adjuvant radiation therapy.     On 02/08/2022 patient began ovarian suppression with goserelin. In 04/2022 she began anastrazole. In 06/2022 she underwent BSO. In 09/2022 she switched to tamoxifen due to arthralgias.     History of Present Illness  Patient returns for scheduled follow up. She denies any self palpated breast masses. No vaginal bleeding.     Patient temporarily discontinued tamoxifen due to muscle cramps. She states that with discontinuation, they resolved completely. She then restarted therapy on an every other day regimen and states that the cramps have returned. Patient tried multiple OTC remedies including magnesium supplements.     Performance Status   Karnofsky 100% - Normal, no complaints.    Past Medical History (historical data, reviewed by physician)  Invasive breast cancer, right (as above)' Sjogren's syndrome; migraine headaches; uterine fibroid.    Past Surgical History (historical data, reviewed by physician)  Right breast lumpectomy with sentinel  lymph node biopsy; tonsillectomy.     Family History (historical data, reviewed by physician)  Ms. Barth has a son.     Ms. Barth has one younger brother and one younger sister. Ms. Barth’s sister had an unspecified thyroid cancer in her late 20s.       Ms. Barth’s mother is in her early 70s and has had multiple skin cancers including melanoma. Ms. Barth’s mother was an only child. Ms. Barth’s maternal grandmother is living in her 90s. Ms. Barth’s maternal grandfather  in his early 70s and did not have cancer.       Ms. Barth’s father is in his early 70s and has had multiple skin cancers including melanoma. Ms. Barth’s father had one brothers and no sisters. Ms. Barth’s paternal grandmother  at age 67 from ovarian cancer. Ms. Barth’s paternal grandfather  at age 80 and did not have cancer.      Social History (historical data, reviewed by physician)  Denies tobacco use.       Current Medications   tamoxifen 20 MG Oral Tab TAKE 1 TABLET BY MOUTH EVERY DAY 90 tablet 1    Cholecalciferol (VITAMIN D3) 25 MCG (1000 UT) Oral Cap Take 1 tablet by mouth daily.      B Complex Vitamins (VITAMIN B COMPLEX) Oral Tab Take by mouth daily.      Dapsone (ACZONE) 5 % External Gel Apply to affected area BID. (Patient taking differently: Apply topically as needed. Apply to affected area BID.) 1 each 0    omega-3 fatty acids 1000 MG Oral Cap Take 1,000 mg by mouth daily.      cycloSPORINE (RESTASIS) 0.05 % Ophthalmic Emulsion Place 1 drop into both eyes 2 (two) times daily. 180 each 3    Magnesium 250 MG Oral Tab Take 1 tablet (250 mg total) by mouth daily.     Allergies   Ms. Barth is allergic to penicillins and radiology contrast iodinated dyes.    Vital Signs   Height: --  Weight: 65.2 kg (143 lb 12.8 oz) (1141)  BSA (Calculated - sq m): --  Pulse: 61 (1141)  BP: 143/73 (1141)  Temp: 97.2 °F (36.2 °C) (1)  Do Not Use - Resp  Rate: --  SpO2: 97 % (05/02 1141)    Physical Examination  Constitutional      Well developed, well nourished. Appears close to chronological age. No apparent distress.   Head   Normocephalic and atraumatic.  Eyes   Conjunctiva clear; sclera anicteric.  ENMT                 External nose normal; external ears normal.   Neck   Supple; no masses.   Lymphatics  No cervical, supraclavicular, axillary adenopathy.   Breasts   Bilateral breasts without suspicious masses.   Respiratory          Normal effort; no respiratory distress; clear to auscultation bilaterally.  Cardiovascular     Regular rate and rhythm.  Abdomen  Soft; not tender; no masses; no hepatosplenomegaly.   Extremities  No lower extremity edema.  Neurologic           Motor and sensory grossly intact.  Psychiatric          Mood and affect appropriate.    Laboratory  Recent Results (from the past 48 hour(s))   COMP METABOLIC PANEL [E]    Collection Time: 05/02/24 12:16 PM   Result Value Ref Range    Glucose 83 70 - 99 mg/dL    Sodium 142 136 - 145 mmol/L    Potassium 4.3 3.5 - 5.1 mmol/L    Chloride 110 98 - 112 mmol/L    CO2 29.0 21.0 - 32.0 mmol/L    Anion Gap 3 0 - 18 mmol/L    BUN 9 9 - 23 mg/dL    Creatinine 0.71 0.55 - 1.02 mg/dL    Calcium, Total 9.1 8.5 - 10.1 mg/dL    Calculated Osmolality 292 275 - 295 mOsm/kg    eGFR-Cr 104 >=60 mL/min/1.73m2    AST 26 15 - 37 U/L    ALT 31 13 - 56 U/L    Alkaline Phosphatase 42 39 - 100 U/L    Bilirubin, Total 0.5 0.1 - 2.0 mg/dL    Total Protein 7.0 6.4 - 8.2 g/dL    Albumin 4.2 3.4 - 5.0 g/dL    Globulin  2.8 2.8 - 4.4 g/dL    A/G Ratio 1.5 1.0 - 2.0    Patient Fasting for CMP? Patient not present    MAGNESIUM [E]    Collection Time: 05/02/24 12:16 PM   Result Value Ref Range    Magnesium 2.5 1.6 - 2.6 mg/dL   PHOSPHORUS [E]    Collection Time: 05/02/24 12:16 PM   Result Value Ref Range    Phosphorus 3.0 2.5 - 4.9 mg/dL   TSH W REFLEX TO FREE T4 [E]    Collection Time: 05/02/24 12:16 PM   Result Value Ref Range     TSH 1.220 0.358 - 3.740 mIU/mL   CBC W/ DIFFERENTIAL    Collection Time: 05/02/24 12:16 PM   Result Value Ref Range    WBC 5.5 4.0 - 11.0 x10(3) uL    RBC 4.60 3.80 - 5.30 x10(6)uL    HGB 13.5 12.0 - 16.0 g/dL    HCT 41.1 35.0 - 48.0 %    .0 150.0 - 450.0 10(3)uL    MCV 89.3 80.0 - 100.0 fL    MCH 29.3 26.0 - 34.0 pg    MCHC 32.8 31.0 - 37.0 g/dL    RDW 12.4 %    Neutrophil Absolute Prelim 3.38 1.50 - 7.70 x10 (3) uL    Neutrophil Absolute 3.38 1.50 - 7.70 x10(3) uL    Lymphocyte Absolute 1.62 1.00 - 4.00 x10(3) uL    Monocyte Absolute 0.41 0.10 - 1.00 x10(3) uL    Eosinophil Absolute 0.01 0.00 - 0.70 x10(3) uL    Basophil Absolute 0.03 0.00 - 0.20 x10(3) uL    Immature Granulocyte Absolute 0.02 0.00 - 1.00 x10(3) uL    Neutrophil % 61.8 %    Lymphocyte % 29.6 %    Monocyte % 7.5 %    Eosinophil % 0.2 %    Basophil % 0.5 %    Immature Granulocyte % 0.4 %   Vitamin D, 25-Hydroxy    Collection Time: 05/02/24 12:16 PM   Result Value Ref Range    Vitamin D, 25OH, Total 28.1 (L) 30.0 - 100.0 ng/mL     Impression and Plan   1.   Invasive mammary carcinoma, right breast: Patient is staged as [id number] or stage IA. Her tumor is estrogen and progesterone receptor positive and Her2/santos negative. She has undergone lumpectomy with sentinel lymph node biopsy. She received adjuvant chemotherapy and adjuvant radiation therapy.           Adjuvant endocrine therapy was recommended and in 02/2022 she began ovarian suppression with goserelin. In 04/2022 she began therapy with anastrazole. On 06/29/2022 he underwent BSO. In 09/2022 she switched to tamoxifen due to excessive arthralgias.           I suggest patient restart tamoxifen daily and use pickle juice for her cramps. If this does not control her symptoms, I suggest that we begin a trial of exemestane. Patient is in agreement. She will provide me with an update in 1 week.           Mammography scheduled for this month. MRI breasts ordered for 6 months.     2.   Skeletal  health: Bone density will be ordered if patient starts and remains on exemestane. Otherwise, this issue will be managed by her primary care physician.     Patient will continue to receive longitudinal care by me for the complex care required for the cancer diagnosis including the expected complications related to anticancer therapy.     Planned Follow Up   As above.     Electronically signed by:    Dany Hawk M.D.  System Medical Director of Oncology Services  [facility]

## 2024-05-02 NOTE — PROGRESS NOTES
Patient is here for MD f/u for Breast cancer. Patient is on Tamoxifen daily. When she initially started Tamoxifen patient had intermittent leg cramps. About 3-4 months ago, cramps worsened and became more consistent. Patient held for a few days and symptoms resolved. Patient started taking Tamoxifen every other day this past month. Cramps worsen with increased activity. Mammogram is scheduled on 5/28.     Education Record    Learner:  Patient    Disease / Diagnosis:  Breast cancer     Barriers / Limitations:  None   Comments:    Method:  Discussion   Comments:    General Topics:  Plan of care reviewed   Comments:    Outcome:  Shows understanding   Comments:

## 2024-05-28 ENCOUNTER — HOSPITAL ENCOUNTER (OUTPATIENT)
Dept: MAMMOGRAPHY | Facility: HOSPITAL | Age: 49
Discharge: HOME | End: 2024-05-28
Attending: SPECIALIST
Payer: COMMERCIAL

## 2024-05-28 DIAGNOSIS — Z17.0 MALIGNANT NEOPLASM OF UPPER-OUTER QUADRANT OF RIGHT BREAST IN FEMALE, ESTROGEN RECEPTOR POSITIVE (HCC): ICD-10-CM

## 2024-05-28 DIAGNOSIS — C50.411 MALIGNANT NEOPLASM OF UPPER-OUTER QUADRANT OF RIGHT BREAST IN FEMALE, ESTROGEN RECEPTOR POSITIVE (HCC): ICD-10-CM

## 2024-05-28 PROCEDURE — 77066 DX MAMMO INCL CAD BI: CPT | Performed by: SPECIALIST

## 2024-05-28 PROCEDURE — 77062 BREAST TOMOSYNTHESIS BI: CPT | Performed by: SPECIALIST

## 2024-05-31 RX ORDER — TAMOXIFEN CITRATE 20 MG/1
20 TABLET ORAL DAILY
Qty: 90 TABLET | Refills: 1 | Status: SHIPPED | OUTPATIENT
Start: 2024-05-31

## 2024-09-26 ENCOUNTER — HOSPITAL ENCOUNTER (OUTPATIENT)
Dept: MRI IMAGING | Facility: HOSPITAL | Age: 49
Discharge: HOME | End: 2024-09-26
Attending: SPECIALIST
Payer: COMMERCIAL

## 2024-09-26 DIAGNOSIS — C50.411 MALIGNANT NEOPLASM OF UPPER-OUTER QUADRANT OF RIGHT BREAST IN FEMALE, ESTROGEN RECEPTOR POSITIVE (HCC): ICD-10-CM

## 2024-09-26 DIAGNOSIS — Z17.0 MALIGNANT NEOPLASM OF UPPER-OUTER QUADRANT OF RIGHT BREAST IN FEMALE, ESTROGEN RECEPTOR POSITIVE (HCC): ICD-10-CM

## 2024-09-26 PROCEDURE — A9575 INJ GADOTERATE MEGLUMI 0.1ML: HCPCS | Performed by: SPECIALIST

## 2024-09-26 PROCEDURE — 77049 MRI BREAST C-+ W/CAD BI: CPT | Performed by: SPECIALIST

## 2024-09-26 RX ORDER — GADOTERATE MEGLUMINE 376.9 MG/ML
15 INJECTION INTRAVENOUS
Status: COMPLETED | OUTPATIENT
Start: 2024-09-26 | End: 2024-09-26

## 2024-09-26 RX ADMIN — GADOTERATE MEGLUMINE 14 ML: 376.9 INJECTION INTRAVENOUS at 12:17:00

## 2024-11-07 ENCOUNTER — APPOINTMENT (OUTPATIENT)
Dept: HEMATOLOGY/ONCOLOGY | Facility: HOSPITAL | Age: 49
End: 2024-11-07
Attending: SPECIALIST
Payer: COMMERCIAL

## 2024-11-11 ENCOUNTER — OFFICE VISIT (OUTPATIENT)
Dept: HEMATOLOGY/ONCOLOGY | Facility: HOSPITAL | Age: 49
End: 2024-11-11
Attending: SPECIALIST
Payer: COMMERCIAL

## 2024-11-11 VITALS
SYSTOLIC BLOOD PRESSURE: 145 MMHG | HEIGHT: 67.44 IN | DIASTOLIC BLOOD PRESSURE: 86 MMHG | RESPIRATION RATE: 18 BRPM | BODY MASS INDEX: 22.46 KG/M2 | TEMPERATURE: 98 F | OXYGEN SATURATION: 100 % | WEIGHT: 144.81 LBS | HEART RATE: 61 BPM

## 2024-11-11 DIAGNOSIS — C50.411 MALIGNANT NEOPLASM OF UPPER-OUTER QUADRANT OF RIGHT BREAST IN FEMALE, ESTROGEN RECEPTOR POSITIVE (HCC): ICD-10-CM

## 2024-11-11 DIAGNOSIS — Z17.0 MALIGNANT NEOPLASM OF UPPER-OUTER QUADRANT OF RIGHT BREAST IN FEMALE, ESTROGEN RECEPTOR POSITIVE (HCC): ICD-10-CM

## 2024-11-11 PROCEDURE — 99214 OFFICE O/P EST MOD 30 MIN: CPT | Performed by: SPECIALIST

## 2024-11-11 PROCEDURE — G2211 COMPLEX E/M VISIT ADD ON: HCPCS | Performed by: SPECIALIST

## 2024-11-11 RX ORDER — EXEMESTANE 25 MG/1
25 TABLET ORAL DAILY
Qty: 90 TABLET | Refills: 1 | Status: SHIPPED | OUTPATIENT
Start: 2024-11-11

## 2024-11-11 NOTE — PROGRESS NOTES
[facility] Progress Note      Patient Name: Avril Barth   YOB: 1975  Medical Record Number: [id number]  Attending Physician: Dany Hawk M.D.     The 21st Century Cures Act makes medical notes like these available to patients in the interest of transparency. Please be advised this is a medical document. Medical documents are intended to carry relevant information, facts as evident, and the clinical opinion of the practitioner. The medical note is intended as peer to peer communication and may appear blunt or direct. It is written in medical language and may contain abbreviations or verbiage that are unfamiliar.     Date of Visit: 11/11/2024     Chief Complaint  Invasive mammary carcinoma, right breast - follow up.    Oncologic History  Avril Barth is a 49 year old female who on 09/17/2021 underwent bilateral mammography and right breast ultrasound for a palpable right breast mass which showed a 1.6 cm mass at the 12 o'clock position in the right breast. Ultrasound guided biopsy performed on 09/20/2021 showed grade 2 invasive mammary carcinoma with focal signet right cells. Immunohistochemical studies were as follows: estrogen receptor 80% positive (moderate), progesterone receptor 40% (moderate-strong), Her2 2+, and Ki67 8%. Her2/santos by FISH was not amplified.     MRI of the breasts on 09/24/2021 showed a 1.1 x 1.5 x 1.9 cm mass in the upper central of the right breast at posterior depth abutting the pectoralis musculature and no suspicious adenopathy. No suspicious findings described in the left breast, though ultrasound of the left axilla has been recommended. Of note, patient had DTP vaccine in the left arm one week prior. Patient was staged as [id number].    Genetic testing revealed no known pathogenic variants were found in the following 48 genes: APC, ROHAN, AXIN2, BAP1, BARD1, BMPR1A, BRCA1, BRCA2, BRIP1, CDH1, CDK4, CDKN2A, CHEK2, CTNNA1, DICER1, GREM1  (promoter region deletion/duplication testing only), HOXB13 (sequencing only), KIT, MEN1, PMS2, MSH2 (including EPCAM rearrangement testing), MSH3, MSH6, MUTYH, NBN, NF1, NTHL1 (sequencing only), PALB2, PDGRFA, PMS2, POLD1, POLE, PTEN, RAD50, RAD51C, RAD51D, SDHA (sequencing only), SDHB, SDHC, SDHD, SMAD4, SMARCA4, STK11, TP53, TSC1, TSC2, and VHL.    On 10/14/2021, she underwent right breast lumpectomy with sentinel lymph node biopsy. Pathology showed 2 foci of grade 2 invasive ductal carcinoma with the larger measuring 1.4 cm; associated grade 2 and 3 ductal carcinoma-in-situ; 6 lymph nodes negative for metastasis (0/6); and negative surgical margins. Her2 by immunohistochemistry was repeated and was 1+ (negative).     OncotypeDX testing revealed a recurrence score of 25.     Patient was premenopausal at diagnosis.    On 11/15/2021 she began adjuvant chemotherapy with docetaxel and cyclophosphamide.     Beginning on 02/02/2022 she received adjuvant radiation therapy.     On 02/08/2022 patient began ovarian suppression with goserelin. In 04/2022 she began anastrazole. In 06/2022 she underwent BSO. In 09/2022 she switched to tamoxifen due to arthralgias. She subsequently discontinued tamoxifen in 08/2024 due to muscle cramps.     History of Present Illness  Patient returns for scheduled follow up. She denies any self palpated breast masses. No vaginal bleeding.     She discontinued tamoxifen in 08/2024 for muscle cramps.     Performance Status   Karnofsky 100% - Normal, no complaints.    Past Medical History (historical data, reviewed by physician)  Invasive breast cancer, right (as above)' Sjogren's syndrome; migraine headaches; uterine fibroid.    Past Surgical History (historical data, reviewed by physician)  Right breast lumpectomy with sentinel lymph node biopsy; tonsillectomy.     Family History (historical data, reviewed by physician)  Ms. Barth has a son.     Ms. Barth has one younger brother and  one younger sister. Ms. Barth’s sister had an unspecified thyroid cancer in her late 20s.       Ms. Barth’s mother is in her early 70s and has had multiple skin cancers including melanoma. Ms. Barth’s mother was an only child. Ms. Barth’s maternal grandmother is living in her 90s. Ms. Barth’s maternal grandfather  in his early 70s and did not have cancer.       Ms. Barth’s father is in his early 70s and has had multiple skin cancers including melanoma. Ms. Barth’s father had one brothers and no sisters. Ms. Barth’s paternal grandmother  at age 67 from ovarian cancer. Ms. Barth’s paternal grandfather  at age 80 and did not have cancer.      Social History (historical data, reviewed by physician)  Denies tobacco use.       Current Medications   exemestane 25 MG Oral Tab Take 1 tablet (25 mg total) by mouth daily. 90 tablet 1    Cholecalciferol (VITAMIN D3) 25 MCG (1000 UT) Oral Cap Take 1 tablet by mouth daily.      B Complex Vitamins (VITAMIN B COMPLEX) Oral Tab Take by mouth daily.      Dapsone (ACZONE) 5 % External Gel Apply to affected area BID. (Patient taking differently: Apply topically as needed. Apply to affected area BID.) 1 each 0    omega-3 fatty acids 1000 MG Oral Cap Take 1,000 mg by mouth daily.      cycloSPORINE (RESTASIS) 0.05 % Ophthalmic Emulsion Place 1 drop into both eyes 2 (two) times daily. 180 each 3    Magnesium 250 MG Oral Tab Take 1 tablet (250 mg total) by mouth daily.       Allergies   Ms. Barth is allergic to penicillins and radiology contrast iodinated dyes.    Vital Signs   Height: 171.3 cm (5' 7.44\") (1053)  Weight: 65.7 kg (144 lb 12.8 oz) (1053)  BSA (Calculated - sq m): 1.77 sq meters (1053)  Pulse: 61 (1053)  BP: 145/86 (1053)  Temp: 97.5 °F (36.4 °C) (1053)  Do Not Use - Resp Rate: --  SpO2: 100 % (1053)    Physical Examination  Constitutional      Well developed, well  nourished. Appears close to chronological age. No apparent distress.   Head   Normocephalic and atraumatic.  Eyes   Conjunctiva clear; sclera anicteric.  ENMT                 External nose normal; external ears normal.   Neck   Supple; no masses.   Lymphatics  No cervical, supraclavicular, axillary adenopathy.   Breasts   Bilateral breasts without suspicious masses.   Respiratory          Normal effort; no respiratory distress; clear to auscultation bilaterally.  Cardiovascular     Regular rate and rhythm; normal S1S2.  Abdomen  Soft; not tender; no masses; no hepatosplenomegaly.   Extremities  No lower extremity edema.  Neurologic           Motor and sensory grossly intact.  Psychiatric          Mood and affect appropriate.    Laboratory  No results found for this or any previous visit (from the past 48 hours).    Impression and Plan   1.   Invasive mammary carcinoma, right breast: Patient is staged as [id number] or stage IA. Her tumor is estrogen and progesterone receptor positive and Her2/santos negative. She has undergone lumpectomy with sentinel lymph node biopsy. She received adjuvant chemotherapy and adjuvant radiation therapy.           Adjuvant endocrine therapy was recommended and in 02/2022 she began ovarian suppression with goserelin. In 04/2022 she began therapy with anastrazole. On 06/29/2022 he underwent BSO. In 09/2022 she switched to tamoxifen due to excessive arthralgias. In 08/2024, she discontinued tamoxifen for muscle cramps.           Patient is willing to try exemestane and a prescription was sent to her pharmacy. I explained that the arthralgias associated with aromatase inhibitors can respond to duloxetine but patient states she would not be interested in that intervention.           Order provided for mammography to be completed this month. MRI ordered for 6 months.    2.   Skeletal health: If patient remains on exemestane, a bone density scan will be ordered.     Planned Follow Up   Patient will  return for follow up in 6 months.    Electronically signed by:    Dany Hawk M.D.  System Medical Director of Oncology Services  [facility]

## 2024-11-11 NOTE — PROGRESS NOTES
Patient is here for MD f/u Breast cancer. Patient stopped Tamoxifen 3 months ago due to side effects. Patient reports severe leg cramps especially in the evenings. It took a few weeks after stopping before leg cramps resolved. Patient had a MRI in September and mammogram in May.     Education Record    Learner:  Patient and Family Member    Disease / Diagnosis:  Breast cancer     Barriers / Limitations:  None   Comments:    Method:  Discussion   Comments:    General Topics:  Plan of care reviewed   Comments:    Outcome:  Shows understanding   Comments:

## 2025-06-02 ENCOUNTER — HOSPITAL ENCOUNTER (OUTPATIENT)
Dept: MAMMOGRAPHY | Facility: HOSPITAL | Age: 50
Discharge: HOME | End: 2025-06-02
Attending: SPECIALIST
Payer: COMMERCIAL

## 2025-06-02 DIAGNOSIS — Z17.0 MALIGNANT NEOPLASM OF UPPER-OUTER QUADRANT OF RIGHT BREAST IN FEMALE, ESTROGEN RECEPTOR POSITIVE (HCC): ICD-10-CM

## 2025-06-02 DIAGNOSIS — C50.411 MALIGNANT NEOPLASM OF UPPER-OUTER QUADRANT OF RIGHT BREAST IN FEMALE, ESTROGEN RECEPTOR POSITIVE (HCC): ICD-10-CM

## 2025-06-02 PROCEDURE — 77062 BREAST TOMOSYNTHESIS BI: CPT | Performed by: SPECIALIST

## 2025-06-02 PROCEDURE — 77066 DX MAMMO INCL CAD BI: CPT | Performed by: SPECIALIST

## (undated) DEVICE — Device: Brand: PLUMEPEN

## (undated) DEVICE — LIGHT HANDLE

## (undated) DEVICE — ENSEAL 20 CM SHAFT, LARGE JAW: Brand: ENSEAL X1

## (undated) DEVICE — UNDYED BRAIDED (POLYGLACTIN 910), SYNTHETIC ABSORBABLE SUTURE: Brand: COATED VICRYL

## (undated) DEVICE — PROVE COVER: Brand: UNBRANDED

## (undated) DEVICE — ALCOHOL 70% 4 OZ

## (undated) DEVICE — BAG DRAIN INFECTION CNTRL 2000

## (undated) DEVICE — TROCAR: Brand: KII FIOS FIRST ENTRY

## (undated) DEVICE — SUT VICRYL 0 J608H

## (undated) DEVICE — GYN LAP/ROBOTIC: Brand: MEDLINE INDUSTRIES, INC.

## (undated) DEVICE — SLEEVE KENDALL SCD EXPRESS MED

## (undated) DEVICE — SYRINGE 10ML LL TIP

## (undated) DEVICE — SOLUTION ENDOSCOPIC ANTI-FOG NON-TOXIC NON-ABRASIVE 6 CUBIC CENTIMETER WITH RADIOPAQUE ADHESIVE-BACKED SPONGE STERILE NOT MADE WITH NATURAL RUBBER LATEX MEDICHOICE: Brand: MEDICHOICE

## (undated) DEVICE — Device

## (undated) DEVICE — [HIGH FLOW INSUFFLATOR,  DO NOT USE IF PACKAGE IS DAMAGED,  KEEP DRY,  KEEP AWAY FROM SUNLIGHT,  PROTECT FROM HEAT AND RADIOACTIVE SOURCES.]: Brand: PNEUMOSURE

## (undated) DEVICE — SCD SLEEVE KNEE HI BLEND

## (undated) DEVICE — SPONGE RAYTEC 4X4 RF DETECT

## (undated) DEVICE — STERILE LATEX POWDER-FREE SURGICAL GLOVES WITH HYDROGEL COATING, SMOOTH FINISH, STRAIGHT FINGER: Brand: PROTEXIS

## (undated) DEVICE — 3M(TM) MICROPORE TAPE DISPENSER 1535-2: Brand: 3M™ MICROPORE™

## (undated) DEVICE — 40580 - THE PINK PAD - ADVANCED TRENDELENBURG POSITIONING KIT: Brand: 40580 - THE PINK PAD - ADVANCED TRENDELENBURG POSITIONING KIT

## (undated) DEVICE — TROCAR: Brand: KII® SLEEVE

## (undated) DEVICE — BAG URINE URIMETER

## (undated) DEVICE — SUPER SPONGES,MEDIUM: Brand: KERLIX

## (undated) DEVICE — BREAST-HERNIA-PORT CDS-LF: Brand: MEDLINE INDUSTRIES, INC.

## (undated) DEVICE — STERILE POLYISOPRENE POWDER-FREE SURGICAL GLOVES: Brand: PROTEXIS

## (undated) DEVICE — SOL  .9 1000ML BTL

## (undated) DEVICE — ENDOPATH ULTRA VERESS INSUFFLATION NEEDLES WITH LUER LOCK CONNECTORS: Brand: ENDOPATH

## (undated) DEVICE — SUT VICRYL 0 UR-6 J603H

## (undated) DEVICE — GOWN,SIRUS,FABRIC-REINFORCED,X-LARGE: Brand: MEDLINE

## (undated) DEVICE — INSUFFLATION NEEDLE TO ESTABLISH PNEUMOPERITONEUM.: Brand: INSUFFLATION NEEDLE

## (undated) DEVICE — PLUMEPORT ACTIV LAPAROSCOPIC SMOKE FILTRATION DEVICE: Brand: PLUMEPORT ACTIVE

## (undated) DEVICE — ANCHOR TISSUE RETRIEVAL SYSTEM, BAG SIZE 175 ML, PORT SIZE 10 MM: Brand: ANCHOR TISSUE RETRIEVAL SYSTEM

## (undated) DEVICE — SOLUTION  .9 1000ML BTL

## (undated) DEVICE — ANCHOR TISSUE RETRIEVAL SYSTEM, BAG SIZE 1200 ML, PORT SIZE 15 MM: Brand: ANCHOR TISSUE RETRIEVAL SYSTEM

## (undated) DEVICE — SUT MONOCRYL 4-0 PS-2 Y496G

## (undated) DEVICE — SUTURE SILK 0 FSL

## (undated) DEVICE — ENSEAL X1 TISSUE SEALER, CURVED JAW, 37 CM SHAFT LENGTH: Brand: ENSEAL

## (undated) DEVICE — SUTURE VICRYL 3-0 SH

## (undated) NOTE — Clinical Note
I had the pleasure of seeing [place] Breath on 9/28/2021. Please see my attached note.     Neelima Bennett MD FACS  [facility]

## (undated) NOTE — Clinical Note
I had the pleasure of seeing Merline Lapping on 4/12/2022. Please see my attached note.     Rebecca Boyer MD FACS  [facility]

## (undated) NOTE — Clinical Note
I had the pleasure of seeing Trinity Oquendo on 10/19/2021. Please see my attached note.     Tea Romero MD FACS  [facility]